# Patient Record
Sex: MALE | Race: WHITE | Employment: OTHER | URBAN - METROPOLITAN AREA
[De-identification: names, ages, dates, MRNs, and addresses within clinical notes are randomized per-mention and may not be internally consistent; named-entity substitution may affect disease eponyms.]

---

## 2017-07-31 RX ORDER — CEFAZOLIN SODIUM IN 0.9 % NACL 2 G/50 ML
2 INTRAVENOUS SOLUTION, PIGGYBACK (ML) INTRAVENOUS ONCE
Status: CANCELLED | OUTPATIENT
Start: 2017-07-31 | End: 2017-07-31

## 2017-07-31 NOTE — H&P
[address] [facility]  Pre Operative History and Physical Exam    Patient ID:  Trace Hui  [id number]  6 y.o.  1945    Today: July 31, 2017       CC:  Right hip pain    HPI:   The patient has end stage arthritis of the right hip. The patient was evaluated and examined in the office prior to today and was found to have a history on physical exam consistent with intra-articular pathology of the right hip. Patient complains of anterior groin pain predominately. Pain occurs during activity. Patient has difficulty putting on socks/shoes and has notable pain when getting up from a chair and getting out of a car. Patient does not complain of significant lateral hip pain or radicular pain down the leg. There have been no changes to the patient's orthopedic condition since the last office visit    Past Medical History:  No past medical history on file. Past Surgical History:  No past surgical history on file. Medications:     Prior to Admission medications    Not on File       Family History:   No family history on file. Social History:      Social History   Substance Use Topics    Smoking status: Not on file    Smokeless tobacco: Not on file    Alcohol use Not on file         Allergies: Allergies not on file     Vitals: There were no vitals taken for this visit. Objective:         General: No Acute distress                   HEENT: Normocephalic/atramatic                   Lungs:  Breathing non-labored                   Heart:   RRR                    Abdomen: soft       Extremities:  Pain with ROM of the right hip which manifests as anterior groin pain. There is decreased internal and external rotation of the affected hip. No significant pain with palpation over the greater trochanteric bursa. No radicular pain with straight leg raise. Patient walks with and antalgic gait. Distally patient has no neurologic deficit.       There is no problem list on file for this patient. Assessment:   1. Arthritis of the Right hip    Plan:   The patient has failed previous conservative treatment for this condition. The patient has pain in the left hip which causes daily symptoms which affects the patient's activities of daily living and quality of life. The risks, benefits, alternatives and possible complications of left total hip arthroplasty have been discussed with the patient including but not limited to infection, bleeding, damage to nerves and blood vessels with particular attention given to risk of damage of the femoral, obturator, lateral femoral cutaneous, superior gluteal, inferior gluteal, and sciatic nerve, risk of dislocation, fracture both intra-op and post-op, limb length inequality, polyethylene wear, implant loosening, risk for continued pain, and risk for revision surgery secondary to but not limited to all of these discussed risks. Further we discussed risk of venous thrombo-embolism including deep vein thrombosis and pulmonary embolism despite being on prophylaxis. We have also previously discussed the potential of morbidity and mortality associated with, but not limited to, the actual surgical procedure, anesthesia, prior medical conditions, and/or the administration of medications perioperatively. I have made no guarantees to the patient regarding outcomes and the patient has voiced understanding of that. The patient has been given the opportunity to ask questions all of which have been answered and the patient wishes to proceed. The patient will be admitted the day of surgery for right total hip replacement.         Signed By: Villa Antonio MD  July 31, 2017

## 2017-08-07 ENCOUNTER — HOSPITAL ENCOUNTER (OUTPATIENT)
Dept: PHYSICAL THERAPY | Age: 72
Discharge: HOME | End: 2017-08-07
Payer: MEDICARE

## 2017-08-07 ENCOUNTER — HOSPITAL ENCOUNTER (OUTPATIENT)
Dept: SURGERY | Age: 72
Discharge: HOME | End: 2017-08-07
Payer: MEDICARE

## 2017-08-07 LAB
ALBUMIN SERPL BCP-MCNC: 3.9 G/DL (ref 3.2–4.6)
ANION GAP BLD CALC-SCNC: 5 MMOL/L (ref 7–16)
APPEARANCE UR: CLEAR
APTT PPP: 27.8 SEC (ref 23.5–31.7)
BACTERIA SPEC CULT: ABNORMAL
BASOPHILS # BLD AUTO: 0 K/UL (ref 0–0.2)
BASOPHILS # BLD: 1 % (ref 0–2)
BILIRUB UR QL: NEGATIVE
BUN SERPL-MCNC: 24 MG/DL (ref 8–23)
CALCIUM SERPL-MCNC: 9.1 MG/DL (ref 8.3–10.4)
CHLORIDE SERPL-SCNC: 104 MMOL/L (ref 98–107)
CO2 SERPL-SCNC: 29 MMOL/L (ref 21–32)
COLOR UR: YELLOW
CREAT SERPL-MCNC: 1.32 MG/DL (ref 0.8–1.5)
DIFFERENTIAL METHOD BLD: ABNORMAL
EOSINOPHIL # BLD: 0.3 K/UL (ref 0–0.8)
EOSINOPHIL NFR BLD: 5 % (ref 0.5–7.8)
ERYTHROCYTE [DISTWIDTH] IN BLOOD BY AUTOMATED COUNT: 13.3 % (ref 11.9–14.6)
EST. AVERAGE GLUCOSE BLD GHB EST-MCNC: 111 MG/DL
GLUCOSE SERPL-MCNC: 91 MG/DL (ref 65–100)
GLUCOSE UR STRIP.AUTO-MCNC: NEGATIVE MG/DL
HBA1C MFR BLD: 5.5 % (ref 4.8–6)
HCT VFR BLD AUTO: 41 % (ref 41.1–50.3)
HGB BLD-MCNC: 13.5 G/DL (ref 13.6–17.2)
HGB UR QL STRIP: NEGATIVE
IMM GRANULOCYTES # BLD: 0 K/UL (ref 0–0.5)
IMM GRANULOCYTES NFR BLD AUTO: 0.4 % (ref 0–5)
INR PPP: 1 (ref 0.9–1.2)
KETONES UR QL STRIP.AUTO: NEGATIVE MG/DL
LEUKOCYTE ESTERASE UR QL STRIP.AUTO: NEGATIVE
LYMPHOCYTES # BLD AUTO: 36 % (ref 13–44)
LYMPHOCYTES # BLD: 2 K/UL (ref 0.5–4.6)
MCH RBC QN AUTO: 31.1 PG (ref 26.1–32.9)
MCHC RBC AUTO-ENTMCNC: 32.9 G/DL (ref 31.4–35)
MCV RBC AUTO: 94.5 FL (ref 79.6–97.8)
MONOCYTES # BLD: 0.6 K/UL (ref 0.1–1.3)
MONOCYTES NFR BLD AUTO: 11 % (ref 4–12)
NEUTS SEG # BLD: 2.6 K/UL (ref 1.7–8.2)
NEUTS SEG NFR BLD AUTO: 47 % (ref 43–78)
NITRITE UR QL STRIP.AUTO: NEGATIVE
PH UR STRIP: 5.5 [PH] (ref 5–9)
PLATELET # BLD AUTO: 123 K/UL (ref 150–450)
PMV BLD AUTO: 11.5 FL (ref 10.8–14.1)
POTASSIUM SERPL-SCNC: 5 MMOL/L (ref 3.5–5.1)
PROT UR STRIP-MCNC: NEGATIVE MG/DL
PROTHROMBIN TIME: 10.7 SEC (ref 9.6–12)
RBC # BLD AUTO: 4.34 M/UL (ref 4.23–5.67)
SERVICE CMNT-IMP: ABNORMAL
SODIUM SERPL-SCNC: 138 MMOL/L (ref 136–145)
SP GR UR REFRACTOMETRY: 1.02 (ref 1–1.02)
UROBILINOGEN UR QL STRIP.AUTO: 0.2 EU/DL (ref 0.2–1)
WBC # BLD AUTO: 5.6 K/UL (ref 4.3–11.1)

## 2017-08-07 PROCEDURE — 81003 URINALYSIS AUTO W/O SCOPE: CPT | Performed by: ORTHOPAEDIC SURGERY

## 2017-08-07 PROCEDURE — G8978 MOBILITY CURRENT STATUS: HCPCS

## 2017-08-07 PROCEDURE — G8980 MOBILITY D/C STATUS: HCPCS

## 2017-08-07 PROCEDURE — 93005 ELECTROCARDIOGRAM TRACING: CPT | Performed by: ORTHOPAEDIC SURGERY

## 2017-08-07 PROCEDURE — 80048 BASIC METABOLIC PNL TOTAL CA: CPT | Performed by: ORTHOPAEDIC SURGERY

## 2017-08-07 PROCEDURE — 85610 PROTHROMBIN TIME: CPT | Performed by: ORTHOPAEDIC SURGERY

## 2017-08-07 PROCEDURE — 82040 ASSAY OF SERUM ALBUMIN: CPT | Performed by: ORTHOPAEDIC SURGERY

## 2017-08-07 PROCEDURE — 85730 THROMBOPLASTIN TIME PARTIAL: CPT | Performed by: ORTHOPAEDIC SURGERY

## 2017-08-07 PROCEDURE — 83036 HEMOGLOBIN GLYCOSYLATED A1C: CPT | Performed by: ORTHOPAEDIC SURGERY

## 2017-08-07 PROCEDURE — 36415 COLL VENOUS BLD VENIPUNCTURE: CPT | Performed by: ORTHOPAEDIC SURGERY

## 2017-08-07 PROCEDURE — G8979 MOBILITY GOAL STATUS: HCPCS

## 2017-08-07 PROCEDURE — 85025 COMPLETE CBC W/AUTO DIFF WBC: CPT | Performed by: ORTHOPAEDIC SURGERY

## 2017-08-07 PROCEDURE — 87641 MR-STAPH DNA AMP PROBE: CPT | Performed by: ORTHOPAEDIC SURGERY

## 2017-08-07 PROCEDURE — 97161 PT EVAL LOW COMPLEX 20 MIN: CPT

## 2017-08-07 PROCEDURE — 77030027138 HC INCENT SPIROMETER -A

## 2017-08-07 PROCEDURE — 80307 DRUG TEST PRSMV CHEM ANLYZR: CPT | Performed by: ORTHOPAEDIC SURGERY

## 2017-08-07 RX ORDER — CHOLECALCIFEROL (VITAMIN D3) 125 MCG
1-2 CAPSULE ORAL
COMMUNITY
End: 2017-08-17

## 2017-08-07 RX ORDER — TRAMADOL HYDROCHLORIDE 50 MG/1
50 TABLET ORAL
COMMUNITY
End: 2019-05-10

## 2017-08-07 RX ORDER — METOPROLOL TARTRATE 25 MG/1
TABLET, FILM COATED ORAL
COMMUNITY
End: 2017-08-07 | Stop reason: CLARIF

## 2017-08-07 RX ORDER — LOSARTAN POTASSIUM 100 MG/1
100 TABLET ORAL DAILY
COMMUNITY

## 2017-08-07 RX ORDER — METOPROLOL SUCCINATE 25 MG/1
25 TABLET, EXTENDED RELEASE ORAL
COMMUNITY

## 2017-08-07 RX ORDER — DIPHENHYDRAMINE HCL 25 MG
25-50 CAPSULE ORAL
COMMUNITY
End: 2019-05-10

## 2017-08-07 NOTE — PERIOP NOTES
Recent Results (from the past 12 hour(s))   CBC WITH AUTOMATED DIFF    Collection Time: 08/07/17  9:15 AM   Result Value Ref Range    WBC 5.6 4.3 - 11.1 K/uL    RBC 4.34 4.23 - 5.67 M/uL    HGB 13.5 (L) 13.6 - 17.2 g/dL    HCT 41.0 (L) 41.1 - 50.3 %    MCV 94.5 79.6 - 97.8 FL    MCH 31.1 26.1 - 32.9 PG    MCHC 32.9 31.4 - 35.0 g/dL    RDW 13.3 11.9 - 14.6 %    PLATELET 796 (L) 392 - 450 K/uL    MPV 11.5 10.8 - 14.1 FL    DF AUTOMATED      NEUTROPHILS 47 43 - 78 %    LYMPHOCYTES 36 13 - 44 %    MONOCYTES 11 4.0 - 12.0 %    EOSINOPHILS 5 0.5 - 7.8 %    BASOPHILS 1 0.0 - 2.0 %    IMMATURE GRANULOCYTES 0.4 0.0 - 5.0 %    ABS. NEUTROPHILS 2.6 1.7 - 8.2 K/UL    ABS. LYMPHOCYTES 2.0 0.5 - 4.6 K/UL    ABS. MONOCYTES 0.6 0.1 - 1.3 K/UL    ABS. EOSINOPHILS 0.3 0.0 - 0.8 K/UL    ABS. BASOPHILS 0.0 0.0 - 0.2 K/UL    ABS. IMM.  GRANS. 0.0 0.0 - 0.5 K/UL   PROTHROMBIN TIME + INR    Collection Time: 08/07/17  9:15 AM   Result Value Ref Range    Prothrombin time 10.7 9.6 - 12.0 sec    INR 1.0 0.9 - 1.2     PTT    Collection Time: 08/07/17  9:15 AM   Result Value Ref Range    aPTT 27.8 23.5 - 30.0 SEC   METABOLIC PANEL, BASIC    Collection Time: 08/07/17  9:15 AM   Result Value Ref Range    Sodium 138 136 - 145 mmol/L    Potassium 5.0 3.5 - 5.1 mmol/L    Chloride 104 98 - 107 mmol/L    CO2 29 21 - 32 mmol/L    Anion gap 5 (L) 7 - 16 mmol/L    Glucose 91 65 - 100 mg/dL    BUN 24 (H) 8 - 23 MG/DL    Creatinine 1.32 0.8 - 1.5 MG/DL    GFR est AA >60 >60 ml/min/1.73m2    GFR est non-AA 57 (L) >60 ml/min/1.73m2    Calcium 9.1 8.3 - 10.4 MG/DL   URINALYSIS W/ RFLX MICROSCOPIC    Collection Time: 08/07/17  9:15 AM   Result Value Ref Range    Color YELLOW      Appearance CLEAR      Specific gravity 1.020 1.001 - 1.023      pH (UA) 5.5 5.0 - 9.0      Protein NEGATIVE  NEG mg/dL    Glucose NEGATIVE  mg/dL    Ketone NEGATIVE  NEG mg/dL    Bilirubin NEGATIVE  NEG      Blood NEGATIVE  NEG      Urobilinogen 0.2 0.2 - 1.0 EU/dL    Nitrites NEGATIVE NEG      Leukocyte Esterase NEGATIVE  NEG     ALBUMIN    Collection Time: 08/07/17  9:15 AM   Result Value Ref Range    Albumin 3.9 3.2 - 4.6 g/dL   HEMOGLOBIN A1C WITH EAG    Collection Time: 08/07/17  9:15 AM   Result Value Ref Range    Hemoglobin A1c 5.5 4.8 - 6.0 %    Est. average glucose 111 mg/dL

## 2017-08-07 NOTE — PROGRESS NOTES
08/07/17 0900   Oxygen Therapy   O2 Sat (%) 96 %   Pulse via Oximetry 66 beats per minute   O2 Device Room air   Pre-Treatment   Breath Sounds Bilateral Clear   Pre FEV1 (liters) 2.9 liters   % Predicted 90   Incentive Spirometry Treatment   Actual Volume (ml) 2250 ml     Initial respiratory Assessment completed with pt. Pt was interviewed and evaluated in [facility] prior to surgery. Patient ID:  Francesco Huynh  [id number]  3 y.o.  1945  Surgeon: {[facility]:[id number]}  Date of Surgery: The linked surgery was not found. Please check manually. Procedure: Total {PAT ORTHO EXTREMITY:[id number]} Arthroplasty  Primary Care Physician: Georgi Simeon -[phone]  Specialists:                                  Pt instructed in the use of Incentive Spirometry. Pt instructed to bring Incentive Spirometer back on date of surgery & to start using Is upon return to pt room.     Pt taught proper cough technique      History of smoking:   NONE      Quit date:    Secondhand smoke:      Past procedures with Oxygen desaturation:NONE    Past Medical History:   Diagnosis Date    Adverse effect of anesthesia     states sedation (Versed) makes him \"drunk as a skunk,\" patient can start getting \"vulgar\"    Environmental and seasonal allergies     HTN (hypertension)     managed with medication     Insomnia     Osteoarthritis     RBBB     per EKG 3/21/17                                    ENT:SINUS                                                                                                                      Respiratory history:                                 SOB  ON EXERTION  DENIES SOB                                  Respiratory meds:                                                         FAMILY PRESENT:             YES        NO                                        PAST SLEEP STUDY:        YES       NO  HX OF CIERA:                        YES      NO                                     CIERA assessment:                                               SLEEPS ON SIDE   BACK   STOMACH                                                 PHYSICAL EXAM   There is no height or weight on file to calculate BMI.    Visit Vitals    SpO2 (P) 96%     Neck circumference:      cm    Loud snoring:YES  DENIES    Witnessed apnea or wakening gasping or choking:,DENIES, APNEA    Awakens with headaches:DENIES    Morning or daytime tiredness/ sleepiness: DENIES  TIRED     Dry mouth or sore throat in morning:YES DENIES    Freidman stage:    SACS score:    STOP/BANG:                              CPAP:        ALBUTEROL NEB Q6 PRN          SPACER       CONT SAT HS      JUN- PT AGREEABLE TO FOLLOW UP IF DESATS  NONE  Referrals:    Pt. Phone Number:

## 2017-08-07 NOTE — PROGRESS NOTES
Angeles Lamar  : (49 y.o.) [facility] [place] [address].  [phone]       Physical Therapy Prehab Plan of Treatment and Evaluation Summary:2017    ICD-10: Treatment Diagnosis:   · Pain in Right Hip (M25.551)  · Stiffness of Right Hip, Not elsewhere classified (M25.651)  · Difficulty in walking, Not elsewhere classified (R26.2)  Precautions/Allergies:   Percocet [oxycodone-acetaminophen] and Sulfa (sulfonamide antibiotics)  MEDICAL/REFERRING DIAGNOSIS:  Unilateral primary osteoarthritis, right hip [M16.11]  REFERRING PHYSICIAN: Nataly Dior MD  DATE OF SURGERY: 17   Assessment:   Comments:  Pt. Reports he plans on anterior hip and will go home with wife for support. PROBLEM LIST (Impacting functional limitations):  Mr. Bladimir Rdz presents with the following right lower extremity(s) problems:  1. Strength  2. Range of Motion  3. Home Exercise Program  4. Pain   INTERVENTIONS PLANNED:  1. Home Exercise Program  2. Educational Discussion     TREATMENT PLAN: Effective Dates: 2017 TO 2017. Frequency/Duration: Patient to continue to perform home exercise program at least twice per day up until his surgery. GOALS: (Goals have been discussed and agreed upon with patient.)  Discharge Goals: Time Frame: 1 Day  1. Patient will demonstrate independence with a home exercise program designed to increase strength, range of motion and pain control to minimize functional deficits and optimize patient for total joint replacement. Rehabilitation Potential For Stated Goals: Good  Regarding Jeronimo Renteria therapy, I certify that the treatment plan above will be carried out by a therapist or under their direction.   Thank you for this referral,  Jeremy De, PT               HISTORY:   Present Symptoms:  Pain Intensity 1:  (8 at worst)  Pain Location 1: Hip   History of Present Injury/Illness (Reason for Referral):  Medical/Referring Diagnosis: Unilateral primary osteoarthritis, right hip [M16.11]   Past Medical History/Comorbidities:   Mr. Maurilio Lea  has a past medical history of Adverse effect of anesthesia; Environmental and seasonal allergies; HTN (hypertension); Insomnia; and Osteoarthritis. Mr. Maurilio Lea  has a past surgical history that includes tonsillectomy (1950's); ankle fracture tx (Left, 07/2012); rotator cuff repair (Right, 11/2012); and knee arthroscopy (Left, 2016).   Social History/Living Environment:   Home Environment: Private residence  # Steps to Enter: 2  Rails to Enter: No  One/Two Story Residence: One story  Living Alone: No  Support Systems: Spouse/Significant Other/Partner  Patient Expects to be Discharged to[de-identified] Private residence  Current DME Used/Available at Home: Belia Salinas, naima, Noble Peterson, mauricio, Cornelio Davis, [address] chair, Com[employer], bedside  Tub or Shower Type: Shower  Work/Activity:  retired  Dominant Side:  LEFT  Current Medications:  See [address] note   Number of Personal Factors/Comorbidities that affect the Plan of Care: 1-2: MODERATE COMPLEXITY   EXAMINATION:   ADLs (Current Functional Status):   Ambulation:  [x] Independent  [] Walk Indoors Only  [] Walk Outdoors  [] Use Assistive Device  [] Use Wheelchair Only Dressing:  [x] Independent  Requires Assistance from Someone for:  [] Sock/Shoes  [] Pants  [] Everything   Bathing/Showering:   [x] Independent  [] Requires Assistance from Someone  [] [address]:  [x] Routine house and yard work  [] Light Housework Only  [] None   Observation/Orthostatic Postural Assessment:   Exceptions to [employer] shoulders  ROM/Flexibility:   Gross Assessment: Yes  AROM: Generally decreased, functional (left LE, left knee extension limited)                       RLE Assessment  RLE Assessment (WDL): Exceptions to WDL (right hip 3/5)  RLE AROM  R Hip Flexion: 100  R Hip ABduction: 10  R Knee Extension: 5   Strength:   Gross Assessment: Yes  Strength: Generally decreased, functional (left LE)                  Functional Mobility:    Gross Assessment: Yes    Gait Description (WDL): Exceptions to WDL  Stand to Sit: Independent, Additional time  Sit to Stand: Independent, Additional time  Distance (ft): 250 Feet (ft)  Ambulation - Level of Assistance: Independent  Speed/Gretta: Delayed  Stance: Right decreased  Gait Abnormalities: Antalgic;Trunk sway increased          Balance:    Sitting: Intact  Standing: Intact   Body Structures Involved:  1. Bones  2. Joints  3. Muscles  4. Ligaments Body Functions Affected:  1. Movement Related Activities and Participation Affected:  1. Mobility   Number of elements that affect the Plan of Care: 1-2: LOW COMPLEXITY   CLINICAL PRESENTATION:   Presentation: Stable and uncomplicated: LOW COMPLEXITY   CLINICAL DECISION MAKING:   Outcome Measure: Tool Used: Lower Extremity Functional Scale (LEFS)  Score:  Initial: 54/80 Most Recent: X/80 (Date: -- )   Interpretation of Score: 20 questions each scored on a 5 point scale with 0 representing \"extreme difficulty or unable to perform\" and 4 representing \"no difficulty\". The lower the score, the greater the functional disability. 80/80 represents no disability. Minimal detectable change is 9 points. Score 80 79-65 64-49 48-33 32-17 16-1 0   Modifier CH CI CJ CK CL CM CN     ? Mobility - Walking and Moving Around:     - CURRENT STATUS: CJ - 20%-39% impaired, limited or restricted    - GOAL STATUS: CJ - 20%-39% impaired, limited or restricted    - D/C STATUS:  CJ - 20%-39% impaired, limited or restricted    Medical Necessity:   · Mr. Jazmyn Jules is expected to optimize his lower extremity strength and ROM in preparation for joint replacement surgery. Reason for Services/Other Comments:  · Achieve baseline assesment of musculoskeletal system, functional mobility and home environment. , educate in PT HEP in preparation for surgery, educate in hospital plan of care.    Use of outcome tool(s) and clinical judgement create a POC that gives a: Clear prediction of patient's progress: LOW COMPLEXITY   TREATMENT:   Treatment/Session Assessment:  Patient was instructed in PT- HEP to increase strength and ROM in LEs. Answered all questions. · Post session pain:  No complaints  · Compliance with Program/Exercises: compliant most of the time.   Total Treatment Duration:PT Patient Time In/Time Out  Time In: 0940  Time Out: [address] Elizabeth, PT

## 2017-08-07 NOTE — PERIOP NOTES
Patient verified name, , and surgery as listed in [facility]. Type 3 surgery, PAT joint assessment complete. Labs per surgeon: nicotine/cotinine, hemoglobin A1C, Albumin, UA, BMP, ptt, pt/inr, cbs, T&S DOS; results within anesthesia limits. Nicotine/Cotinine results pending-charge nurse to follow-up. Labs per anesthesia protocol: All required lab work included in surgeon's orders. EKG: completed 3/21/17; results within anesthesia limits. EKG (3/21/17) received from patient's PCP's office-placed on chart for anesthesia reference. Hibiclens and instructions to return bottle on DOS given per hospital policy. Nasal Swab collected per MD order and instructions for Mupirocin nasal ointment if required. Patient provided with handouts including Guide to Surgery, Pain Management, Hand Hygiene, Blood Transfusion Education, and [facility] Brochure. Patient answered medical/surgical history questions at their best of ability. All prior to admission medications documented in [facility]. Original medication prescription bottle NOT visualized during patient appointment. Patient instructed to hold all vitamins 7 days prior to surgery and NSAIDS 5 days prior to surgery. Medications to be held on the day of surgery: Vitamins and Aleve. Patient instructed to continue previous medications as prescribed prior to surgery and to take the following medications the day of surgery according to anesthesia guidelines with a small sip of water: Tramadol if needed. Patient instructed to bring incentive spirometer and bottle of Hibiclens to the hospital on the DOS. Patient teach back successful and patient demonstrates knowledge of instruction.

## 2017-08-07 NOTE — PERIOP NOTES
Labs dated 8/7/17 routed via [address] [facility] to patients PCP, Dr. Mauricio Salcedo, per Dr. Criselda Watkins request.

## 2017-08-07 NOTE — ADVANCED PRACTICE NURSE
Total Joint Surgery Preoperative Chart Review      Patient ID:  Francesco Huynh  [id number]  89 y.o.  1945  Surgeon: Dr Ruiz Pérez  Date of Surgery: 8/16/2017  Procedure: Total Right Hip Arthroplasty  Primary Care Physician: Georgi Simeon -[phone]  Specialty Physician(s):      Subjective:   Francesco Huynh is a 66 y.o. WHITE OR  male who presents for preoperative evaluation for Total Right Hip arthroplasty. This is a preoperative chart review note based on data collected by the nurse at the surgical Pre-Assessment visit. Past Medical History:   Diagnosis Date    Adverse effect of anesthesia     states sedation (Versed) makes him \"drunk as a skunk,\" patient can start getting \"vulgar\"    Environmental and seasonal allergies     HTN (hypertension)     managed with medication     Insomnia     Osteoarthritis     RBBB     per EKG 3/21/17      Past Surgical History:   Procedure Laterality Date    HX ANKLE FRACTURE TX Left 07/2012    HX KNEE ARTHROSCOPY Left 2016    HX ROTATOR CUFF REPAIR Right 11/2012    HX TONSILLECTOMY  1950's     Family History   Problem Relation Age of Onset    Cancer Father       Social History   Substance Use Topics    Smoking status: Never Smoker    Smokeless tobacco: Never Used    Alcohol use Yes      Comment: occa       Prior to Admission medications    Medication Sig Start Date End Date Taking? Authorizing Provider   losartan (COZAAR) 100 mg tablet Take 100 mg by mouth daily. Indications: hypertension   Yes Historical Provider   MULTIVIT-MINERALS/FA/LYCOPENE (ONE-A-DAY MEN'S PO) Take 2 Tabs by mouth every evening. Yes Historical Provider   diphenhydrAMINE (BENADRYL) 25 mg capsule Take 25-50 mg by mouth nightly. Indications: \"for sleep and allergies\"   Yes Historical Provider   MELATONIN PO Take  by mouth nightly. Yes Historical Provider   traMADol (ULTRAM) 50 mg tablet Take 50 mg by mouth every six (6) hours as needed for Pain.  Take / use AM day of surgery per anesthesia protocols PRN   Indications: Pain   Yes Historical Provider   naproxen sodium (ALEVE) 220 mg cap Take 1-2 Tabs by mouth nightly. Indications: Pain   Yes Historical Provider   VIT C/VIT E/LUTEIN/MIN/OMEGA-3 (OCUVITE PO) Take 1 Tab by mouth every evening. Yes Historical Provider   GLUCOSAMINE/CHONDR PUENTE A SOD (OSTEO BI-FLEX PO) Take 2 Tabs by mouth every evening. Yes Historical Provider   MULTIVITAMIN WITH MINERALS (HAIR,SKIN AND NAILS PO) Take 3 Tabs by mouth every evening. Yes Historical Provider   metoprolol succinate (TOPROL-XL) 25 mg XL tablet Take 25 mg by mouth nightly. Yes Historical Provider     Allergies   Allergen Reactions    Percocet [Oxycodone-Acetaminophen] Itching    Sulfa (Sulfonamide Antibiotics) Hives          Objective:     Physical Exam:   Patient Vitals for the past 24 hrs:   Temp Pulse Resp BP SpO2   08/07/17 1022 - 60 - (!) 158/97 98 %   08/07/17 1019 98 °F (36.7 °C) - 18 - -       ECG:    EKG Results     Procedure 720 Value Units Date/Time    EKG, 12 LEAD, INITIAL [[id number]]     Order Status:  Completed           Data Review:   Labs:   Recent Results (from the past 24 hour(s))   CBC WITH AUTOMATED DIFF    Collection Time: 08/07/17  9:15 AM   Result Value Ref Range    WBC 5.6 4.3 - 11.1 K/uL    RBC 4.34 4.23 - 5.67 M/uL    HGB 13.5 (L) 13.6 - 17.2 g/dL    HCT 41.0 (L) 41.1 - 50.3 %    MCV 94.5 79.6 - 97.8 FL    MCH 31.1 26.1 - 32.9 PG    MCHC 32.9 31.4 - 35.0 g/dL    RDW 13.3 11.9 - 14.6 %    PLATELET 427 (L) 431 - 450 K/uL    MPV 11.5 10.8 - 14.1 FL    DF AUTOMATED      NEUTROPHILS 47 43 - 78 %    LYMPHOCYTES 36 13 - 44 %    MONOCYTES 11 4.0 - 12.0 %    EOSINOPHILS 5 0.5 - 7.8 %    BASOPHILS 1 0.0 - 2.0 %    IMMATURE GRANULOCYTES 0.4 0.0 - 5.0 %    ABS. NEUTROPHILS 2.6 1.7 - 8.2 K/UL    ABS. LYMPHOCYTES 2.0 0.5 - 4.6 K/UL    ABS. MONOCYTES 0.6 0.1 - 1.3 K/UL    ABS. EOSINOPHILS 0.3 0.0 - 0.8 K/UL    ABS. BASOPHILS 0.0 0.0 - 0.2 K/UL    ABS. IMM.  GRANS. 0.0 0.0 - 0.5 K/UL   PROTHROMBIN TIME + INR    Collection Time: 08/07/17  9:15 AM   Result Value Ref Range    Prothrombin time 10.7 9.6 - 12.0 sec    INR 1.0 0.9 - 1.2     PTT    Collection Time: 08/07/17  9:15 AM   Result Value Ref Range    aPTT 27.8 23.5 - 51.6 SEC   METABOLIC PANEL, BASIC    Collection Time: 08/07/17  9:15 AM   Result Value Ref Range    Sodium 138 136 - 145 mmol/L    Potassium 5.0 3.5 - 5.1 mmol/L    Chloride 104 98 - 107 mmol/L    CO2 29 21 - 32 mmol/L    Anion gap 5 (L) 7 - 16 mmol/L    Glucose 91 65 - 100 mg/dL    BUN 24 (H) 8 - 23 MG/DL    Creatinine 1.32 0.8 - 1.5 MG/DL    GFR est AA >60 >60 ml/min/1.73m2    GFR est non-AA 57 (L) >60 ml/min/1.73m2    Calcium 9.1 8.3 - 10.4 MG/DL   URINALYSIS W/ RFLX MICROSCOPIC    Collection Time: 08/07/17  9:15 AM   Result Value Ref Range    Color YELLOW      Appearance CLEAR      Specific gravity 1.020 1.001 - 1.023      pH (UA) 5.5 5.0 - 9.0      Protein NEGATIVE  NEG mg/dL    Glucose NEGATIVE  mg/dL    Ketone NEGATIVE  NEG mg/dL    Bilirubin NEGATIVE  NEG      Blood NEGATIVE  NEG      Urobilinogen 0.2 0.2 - 1.0 EU/dL    Nitrites NEGATIVE  NEG      Leukocyte Esterase NEGATIVE  NEG     ALBUMIN    Collection Time: 08/07/17  9:15 AM   Result Value Ref Range    Albumin 3.9 3.2 - 4.6 g/dL   HEMOGLOBIN A1C WITH EAG    Collection Time: 08/07/17  9:15 AM   Result Value Ref Range    Hemoglobin A1c 5.5 4.8 - 6.0 %    Est. average glucose 111 mg/dL         Problem List:  )There is no problem list on file for this patient. Total Joint Surgery Pre-Assessment Recommendations:        Low risk assessment based on historical assessment. No further recommendations prior to scheduled surgery.            Signed By: GARCIA Baez    August 7, 2017

## 2017-08-08 LAB
COTININE UR QL SCN: NEGATIVE NG/ML
DRUG SCREEN COMMENT:, 753798: NORMAL

## 2017-08-08 NOTE — PERIOP NOTES
Prescription for Mupirocin ointment 22g tube, apply intranasally to both nostrils BID x5 days pre-operatively or for a total of 10 doses; called to [facility] at [phone]. Message left for pt to return call to [phone] for swab results. 8/7/2017  1:45 PM - Mark, Lab In [id number]   Component Results   Component Value Flag Ref Range Units Status   Special Requests: NASAL O2     Final   Culture result:  (A)    Final   MRSA target DNA detected, SA target DNA detected.       A positive test result does not necessarily indicate the presence of viable organisms.  It is however, presumptive for the presence of MRSA or SA.

## 2017-08-08 NOTE — PERIOP NOTES
8/8/2017  8:39 AM - Mark, Lab In [facility]   Component Results   Component Value Flag Ref Range Units Status   Cotinine Screen, urine NEGATIVE   Azittr=096 ng/mL Final   Drug Screen Comment: Comment     Final

## 2017-08-09 VITALS
HEART RATE: 60 BPM | HEIGHT: 70 IN | TEMPERATURE: 98 F | DIASTOLIC BLOOD PRESSURE: 97 MMHG | OXYGEN SATURATION: 98 % | WEIGHT: 194 LBS | RESPIRATION RATE: 18 BRPM | BODY MASS INDEX: 27.77 KG/M2 | SYSTOLIC BLOOD PRESSURE: 158 MMHG

## 2017-08-09 NOTE — PROGRESS NOTES
08/07/17 0900   Oxygen Therapy   O2 Sat (%) 96 %   Pulse via Oximetry 66 beats per minute   O2 Device Room air   Pre-Treatment   Breath Sounds Bilateral Clear   Pre FEV1 (liters) 2.9 liters   % Predicted 90   Incentive Spirometry Treatment   Actual Volume (ml) 2250 ml     Initial respiratory Assessment completed with pt. Pt was interviewed and evaluated in [facility] prior to surgery. Patient ID:  Nick Seals  [id number]  67 y.o.  1945  Surgeon: Blake Neal  Date of Surgery: 8/16/2017  Procedure: Total Right Hip Arthroplasty  Primary Care Physician: Oumar Eugene -[phone]  Specialists:                                  Pt instructed in the use of Incentive Spirometry. Pt instructed to bring Incentive Spirometer back on date of surgery & to start using Is upon return to pt room.     Pt taught proper cough technique      History of smoking:   NONE      Quit date:    Secondhand smoke:FATHER -PIPE      Past procedures with Oxygen desaturation:NONE    Past Medical History:   Diagnosis Date    Adverse effect of anesthesia     states sedation (Versed) makes him \"drunk as a skunk,\" patient can start getting \"vulgar\"    Environmental and seasonal allergies     HTN (hypertension)     managed with medication     Insomnia     Osteoarthritis     RBBB     per EKG 3/21/17                                                                                                                                                        Respiratory history:                                   DENIES SOB                                  Respiratory meds:                                                         FAMILY PRESENT:             YES        WIFE                                      PAST SLEEP STUDY:               NO  HX OF CIERA:                               NO                                     CIERA assessment:                                               SLEEPS ON SIDE PHYSICAL EXAM   Body mass index is 27.84 kg/(m^2).    Visit Vitals    BP (!) 158/97    Pulse 60    Temp 98 °F (36.7 °C)    Resp 18    Ht 5' 10\" (1.778 m)    Wt 88 kg (194 lb)    SpO2 98%    BMI 27.84 kg/m2     Neck circumference: 42     cm    Loud snoring: PUFFS  Witnessed apnea or wakening gasping or choking:,DENIES   Awakens with headaches:DENIES    Morning or daytime tiredness/ sleepiness: DENIES     Dry mouth or sore throat in morning: DENIES    Freidman stage:4    SACS score:11    STOP/BAN                              CPAP:NA             NONE  Referrals:    Pt. Phone Number:

## 2017-08-15 ENCOUNTER — ANESTHESIA EVENT (OUTPATIENT)
Dept: SURGERY | Age: 72
DRG: 470 | End: 2017-08-15
Payer: MEDICARE

## 2017-08-15 RX ORDER — VANCOMYCIN HYDROCHLORIDE
1250 ONCE
Status: COMPLETED | OUTPATIENT
Start: 2017-08-16 | End: 2017-08-16

## 2017-08-16 ENCOUNTER — HOSPITAL ENCOUNTER (INPATIENT)
Age: 72
LOS: 1 days | Discharge: HOME HEALTH | DRG: 470 | End: 2017-08-17
Attending: ORTHOPAEDIC SURGERY | Admitting: ORTHOPAEDIC SURGERY
Payer: MEDICARE

## 2017-08-16 ENCOUNTER — ANESTHESIA (OUTPATIENT)
Dept: SURGERY | Age: 72
DRG: 470 | End: 2017-08-16
Payer: MEDICARE

## 2017-08-16 ENCOUNTER — APPOINTMENT (OUTPATIENT)
Dept: GENERAL RADIOLOGY | Age: 72
DRG: 470 | End: 2017-08-16
Attending: ORTHOPAEDIC SURGERY
Payer: MEDICARE

## 2017-08-16 PROBLEM — M16.9 OA (OSTEOARTHRITIS) OF HIP: Status: ACTIVE | Noted: 2017-08-16

## 2017-08-16 LAB
ABO + RH BLD: NORMAL
BLOOD GROUP ANTIBODIES SERPL: NORMAL
GLUCOSE BLD STRIP.AUTO-MCNC: 235 MG/DL (ref 65–100)
GLUCOSE BLD STRIP.AUTO-MCNC: 84 MG/DL (ref 65–100)
HGB BLD-MCNC: 12.2 G/DL (ref 13.6–17.2)
SPECIMEN EXP DATE BLD: NORMAL

## 2017-08-16 PROCEDURE — 77030033067 HC SUT PDO STRATFX SPIR J&J -B: Performed by: ORTHOPAEDIC SURGERY

## 2017-08-16 PROCEDURE — C1776 JOINT DEVICE (IMPLANTABLE): HCPCS | Performed by: ORTHOPAEDIC SURGERY

## 2017-08-16 PROCEDURE — 85018 HEMOGLOBIN: CPT | Performed by: ORTHOPAEDIC SURGERY

## 2017-08-16 PROCEDURE — 74011250636 HC RX REV CODE- 250/636: Performed by: ORTHOPAEDIC SURGERY

## 2017-08-16 PROCEDURE — 74011250636 HC RX REV CODE- 250/636: Performed by: ANESTHESIOLOGY

## 2017-08-16 PROCEDURE — 76210000006 HC OR PH I REC 0.5 TO 1 HR: Performed by: ORTHOPAEDIC SURGERY

## 2017-08-16 PROCEDURE — 77030018836 HC SOL IRR NACL ICUM -A: Performed by: ORTHOPAEDIC SURGERY

## 2017-08-16 PROCEDURE — 74011250636 HC RX REV CODE- 250/636

## 2017-08-16 PROCEDURE — 77030002933 HC SUT MCRYL J&J -A: Performed by: ORTHOPAEDIC SURGERY

## 2017-08-16 PROCEDURE — 77030013727 HC IRR FAN PULSVC ZIMM -B: Performed by: ORTHOPAEDIC SURGERY

## 2017-08-16 PROCEDURE — 77030012890

## 2017-08-16 PROCEDURE — 77030007880 HC KT SPN EPDRL BBMI -B: Performed by: ANESTHESIOLOGY

## 2017-08-16 PROCEDURE — 77030032490 HC SLV COMPR SCD KNE COVD -B

## 2017-08-16 PROCEDURE — 77030019557 HC ELECTRD VES SEAL MEDT -F: Performed by: ORTHOPAEDIC SURGERY

## 2017-08-16 PROCEDURE — 77030018074 HC RTVR SUT ARTH4 S&N -B: Performed by: ORTHOPAEDIC SURGERY

## 2017-08-16 PROCEDURE — 76010000162 HC OR TIME 1.5 TO 2 HR INTENSV-TIER 1: Performed by: ORTHOPAEDIC SURGERY

## 2017-08-16 PROCEDURE — 77030003665 HC NDL SPN BBMI -A: Performed by: ANESTHESIOLOGY

## 2017-08-16 PROCEDURE — 74011250637 HC RX REV CODE- 250/637: Performed by: ANESTHESIOLOGY

## 2017-08-16 PROCEDURE — 77030034849: Performed by: ORTHOPAEDIC SURGERY

## 2017-08-16 PROCEDURE — 77030011640 HC PAD GRND REM COVD -A: Performed by: ORTHOPAEDIC SURGERY

## 2017-08-16 PROCEDURE — 82962 GLUCOSE BLOOD TEST: CPT

## 2017-08-16 PROCEDURE — 77030003666 HC NDL SPINAL BD -A: Performed by: ORTHOPAEDIC SURGERY

## 2017-08-16 PROCEDURE — 77030020782 HC GWN BAIR PAWS FLX 3M -B: Performed by: ANESTHESIOLOGY

## 2017-08-16 PROCEDURE — 77030006777 HC BLD SAW OSC CNMD -B: Performed by: ORTHOPAEDIC SURGERY

## 2017-08-16 PROCEDURE — 77030011266 HC ELECTRD BLD INSL COVD -A: Performed by: ORTHOPAEDIC SURGERY

## 2017-08-16 PROCEDURE — 94760 N-INVAS EAR/PLS OXIMETRY 1: CPT

## 2017-08-16 PROCEDURE — 77030002922 HC SUT FBRWRE ARTH -B: Performed by: ORTHOPAEDIC SURGERY

## 2017-08-16 PROCEDURE — 74011000250 HC RX REV CODE- 250

## 2017-08-16 PROCEDURE — 74011250637 HC RX REV CODE- 250/637: Performed by: ORTHOPAEDIC SURGERY

## 2017-08-16 PROCEDURE — 74011000250 HC RX REV CODE- 250: Performed by: ANESTHESIOLOGY

## 2017-08-16 PROCEDURE — 72170 X-RAY EXAM OF PELVIS: CPT

## 2017-08-16 PROCEDURE — 65270000029 HC RM PRIVATE

## 2017-08-16 PROCEDURE — 86900 BLOOD TYPING SEROLOGIC ABO: CPT | Performed by: ORTHOPAEDIC SURGERY

## 2017-08-16 PROCEDURE — 77030031139 HC SUT VCRL2 J&J -A: Performed by: ORTHOPAEDIC SURGERY

## 2017-08-16 PROCEDURE — 77030019940 HC BLNKT HYPOTHRM STRY -B: Performed by: ANESTHESIOLOGY

## 2017-08-16 PROCEDURE — 77030034479 HC ADH SKN CLSR PRINEO J&J -B: Performed by: ORTHOPAEDIC SURGERY

## 2017-08-16 PROCEDURE — 0SR904A REPLACEMENT OF RIGHT HIP JOINT WITH CERAMIC ON POLYETHYLENE SYNTHETIC SUBSTITUTE, UNCEMENTED, OPEN APPROACH: ICD-10-PCS | Performed by: ORTHOPAEDIC SURGERY

## 2017-08-16 PROCEDURE — 97165 OT EVAL LOW COMPLEX 30 MIN: CPT

## 2017-08-16 PROCEDURE — 74011000250 HC RX REV CODE- 250: Performed by: ORTHOPAEDIC SURGERY

## 2017-08-16 PROCEDURE — 97161 PT EVAL LOW COMPLEX 20 MIN: CPT

## 2017-08-16 PROCEDURE — 76060000034 HC ANESTHESIA 1.5 TO 2 HR: Performed by: ORTHOPAEDIC SURGERY

## 2017-08-16 PROCEDURE — 86580 TB INTRADERMAL TEST: CPT | Performed by: ORTHOPAEDIC SURGERY

## 2017-08-16 PROCEDURE — 74011000302 HC RX REV CODE- 302: Performed by: ORTHOPAEDIC SURGERY

## 2017-08-16 PROCEDURE — 36415 COLL VENOUS BLD VENIPUNCTURE: CPT | Performed by: ORTHOPAEDIC SURGERY

## 2017-08-16 DEVICE — HEAD FEM DELT V40 +0MM NK 36MM -- V40 BIOLOX: Type: IMPLANTABLE DEVICE | Site: HIP | Status: FUNCTIONAL

## 2017-08-16 DEVICE — SHELL ACET SZ E DIA56MM HIP X3 TRITANIUM HMSPHR CLUS H MOD: Type: IMPLANTABLE DEVICE | Site: HIP | Status: FUNCTIONAL

## 2017-08-16 DEVICE — LINER ACET SZ E ID36MM THK5.9MM 10DEG X3 FOR 54-56MM: Type: IMPLANTABLE DEVICE | Site: HIP | Status: FUNCTIONAL

## 2017-08-16 DEVICE — STEM FEM SZ 5 127D 35X108X44MM -- ACCOLADE II V40: Type: IMPLANTABLE DEVICE | Site: HIP | Status: FUNCTIONAL

## 2017-08-16 RX ORDER — KETOROLAC TROMETHAMINE 30 MG/ML
INJECTION, SOLUTION INTRAMUSCULAR; INTRAVENOUS AS NEEDED
Status: DISCONTINUED | OUTPATIENT
Start: 2017-08-16 | End: 2017-08-16 | Stop reason: HOSPADM

## 2017-08-16 RX ORDER — CELECOXIB 200 MG/1
200 CAPSULE ORAL EVERY 12 HOURS
Status: DISCONTINUED | OUTPATIENT
Start: 2017-08-16 | End: 2017-08-17 | Stop reason: HOSPADM

## 2017-08-16 RX ORDER — SODIUM CHLORIDE 0.9 % (FLUSH) 0.9 %
5-10 SYRINGE (ML) INJECTION AS NEEDED
Status: DISCONTINUED | OUTPATIENT
Start: 2017-08-16 | End: 2017-08-17 | Stop reason: HOSPADM

## 2017-08-16 RX ORDER — TRAMADOL HYDROCHLORIDE 50 MG/1
50 TABLET ORAL
Status: DISCONTINUED | OUTPATIENT
Start: 2017-08-16 | End: 2017-08-17 | Stop reason: HOSPADM

## 2017-08-16 RX ORDER — HYDROMORPHONE HYDROCHLORIDE 2 MG/1
2 TABLET ORAL
Status: DISCONTINUED | OUTPATIENT
Start: 2017-08-16 | End: 2017-08-17 | Stop reason: HOSPADM

## 2017-08-16 RX ORDER — BUPIVACAINE HYDROCHLORIDE 7.5 MG/ML
INJECTION, SOLUTION INTRASPINAL AS NEEDED
Status: DISCONTINUED | OUTPATIENT
Start: 2017-08-16 | End: 2017-08-16 | Stop reason: HOSPADM

## 2017-08-16 RX ORDER — CELECOXIB 200 MG/1
200 CAPSULE ORAL EVERY 12 HOURS
Qty: 60 CAP | Refills: 2 | Status: SHIPPED | OUTPATIENT
Start: 2017-08-16 | End: 2017-11-14

## 2017-08-16 RX ORDER — GABAPENTIN 100 MG/1
100 CAPSULE ORAL 2 TIMES DAILY
Qty: 60 CAP | Refills: 0 | Status: SHIPPED | OUTPATIENT
Start: 2017-08-16 | End: 2019-05-10

## 2017-08-16 RX ORDER — DEXAMETHASONE SODIUM PHOSPHATE 100 MG/10ML
10 INJECTION INTRAMUSCULAR; INTRAVENOUS ONCE
Status: DISCONTINUED | OUTPATIENT
Start: 2017-08-17 | End: 2017-08-17 | Stop reason: HOSPADM

## 2017-08-16 RX ORDER — ACETAMINOPHEN 500 MG
1000 TABLET ORAL EVERY 6 HOURS
Status: DISCONTINUED | OUTPATIENT
Start: 2017-08-17 | End: 2017-08-17 | Stop reason: HOSPADM

## 2017-08-16 RX ORDER — HYDROMORPHONE HYDROCHLORIDE 2 MG/1
2 TABLET ORAL
Qty: 90 TAB | Refills: 0 | Status: SHIPPED | OUTPATIENT
Start: 2017-08-16 | End: 2019-05-10

## 2017-08-16 RX ORDER — SODIUM CHLORIDE 0.9 % (FLUSH) 0.9 %
5-10 SYRINGE (ML) INJECTION EVERY 8 HOURS
Status: DISCONTINUED | OUTPATIENT
Start: 2017-08-16 | End: 2017-08-17 | Stop reason: HOSPADM

## 2017-08-16 RX ORDER — ONDANSETRON 8 MG/1
8 TABLET, ORALLY DISINTEGRATING ORAL
Qty: 60 TAB | Refills: 0 | Status: SHIPPED | OUTPATIENT
Start: 2017-08-16 | End: 2019-05-10

## 2017-08-16 RX ORDER — LIDOCAINE HYDROCHLORIDE 10 MG/ML
0.1 INJECTION INFILTRATION; PERINEURAL AS NEEDED
Status: DISCONTINUED | OUTPATIENT
Start: 2017-08-16 | End: 2017-08-16 | Stop reason: HOSPADM

## 2017-08-16 RX ORDER — AMOXICILLIN 250 MG
2 CAPSULE ORAL DAILY
Qty: 120 TAB | Refills: 0 | Status: SHIPPED | OUTPATIENT
Start: 2017-08-17 | End: 2019-05-10

## 2017-08-16 RX ORDER — CEFAZOLIN SODIUM IN 0.9 % NACL 2 G/50 ML
2 INTRAVENOUS SOLUTION, PIGGYBACK (ML) INTRAVENOUS ONCE
Status: COMPLETED | OUTPATIENT
Start: 2017-08-16 | End: 2017-08-16

## 2017-08-16 RX ORDER — ASPIRIN 325 MG
325 TABLET, DELAYED RELEASE (ENTERIC COATED) ORAL EVERY 12 HOURS
Qty: 60 TAB | Refills: 0 | Status: SHIPPED | OUTPATIENT
Start: 2017-08-16 | End: 2019-05-10

## 2017-08-16 RX ORDER — AMOXICILLIN 250 MG
2 CAPSULE ORAL DAILY
Status: DISCONTINUED | OUTPATIENT
Start: 2017-08-17 | End: 2017-08-17 | Stop reason: HOSPADM

## 2017-08-16 RX ORDER — SODIUM CHLORIDE 9 MG/ML
100 INJECTION, SOLUTION INTRAVENOUS CONTINUOUS
Status: DISCONTINUED | OUTPATIENT
Start: 2017-08-16 | End: 2017-08-17 | Stop reason: HOSPADM

## 2017-08-16 RX ORDER — TRANEXAMIC ACID 650 1/1
1300 TABLET ORAL DAILY
Status: DISCONTINUED | OUTPATIENT
Start: 2017-08-17 | End: 2017-08-17 | Stop reason: HOSPADM

## 2017-08-16 RX ORDER — ACETAMINOPHEN 500 MG
1000 TABLET ORAL ONCE
Status: DISCONTINUED | OUTPATIENT
Start: 2017-08-16 | End: 2017-08-16 | Stop reason: HOSPADM

## 2017-08-16 RX ORDER — NALOXONE HYDROCHLORIDE 0.4 MG/ML
.2-.4 INJECTION, SOLUTION INTRAMUSCULAR; INTRAVENOUS; SUBCUTANEOUS
Status: DISCONTINUED | OUTPATIENT
Start: 2017-08-16 | End: 2017-08-17 | Stop reason: HOSPADM

## 2017-08-16 RX ORDER — HYDROMORPHONE HYDROCHLORIDE 2 MG/ML
0.5 INJECTION, SOLUTION INTRAMUSCULAR; INTRAVENOUS; SUBCUTANEOUS
Status: DISCONTINUED | OUTPATIENT
Start: 2017-08-16 | End: 2017-08-16 | Stop reason: HOSPADM

## 2017-08-16 RX ORDER — ROPIVACAINE HYDROCHLORIDE 2 MG/ML
INJECTION, SOLUTION EPIDURAL; INFILTRATION; PERINEURAL AS NEEDED
Status: DISCONTINUED | OUTPATIENT
Start: 2017-08-16 | End: 2017-08-16 | Stop reason: HOSPADM

## 2017-08-16 RX ORDER — ACETAMINOPHEN 10 MG/ML
1000 INJECTION, SOLUTION INTRAVENOUS ONCE
Status: COMPLETED | OUTPATIENT
Start: 2017-08-16 | End: 2017-08-16

## 2017-08-16 RX ORDER — METOPROLOL SUCCINATE 25 MG/1
25 TABLET, EXTENDED RELEASE ORAL
Status: COMPLETED | OUTPATIENT
Start: 2017-08-16 | End: 2017-08-16

## 2017-08-16 RX ORDER — GABAPENTIN 100 MG/1
100 CAPSULE ORAL 2 TIMES DAILY
Status: DISCONTINUED | OUTPATIENT
Start: 2017-08-16 | End: 2017-08-17 | Stop reason: HOSPADM

## 2017-08-16 RX ORDER — CEFAZOLIN SODIUM IN 0.9 % NACL 2 G/50 ML
2 INTRAVENOUS SOLUTION, PIGGYBACK (ML) INTRAVENOUS EVERY 8 HOURS
Status: COMPLETED | OUTPATIENT
Start: 2017-08-16 | End: 2017-08-17

## 2017-08-16 RX ORDER — DIPHENHYDRAMINE HCL 25 MG
25-50 CAPSULE ORAL
Status: DISCONTINUED | OUTPATIENT
Start: 2017-08-16 | End: 2017-08-17 | Stop reason: HOSPADM

## 2017-08-16 RX ORDER — ACETAMINOPHEN 500 MG
1000 TABLET ORAL EVERY 6 HOURS
Qty: 120 TAB | Refills: 0 | Status: SHIPPED | OUTPATIENT
Start: 2017-08-17 | End: 2019-05-10

## 2017-08-16 RX ORDER — TRANEXAMIC ACID 100 MG/ML
INJECTION, SOLUTION INTRAVENOUS AS NEEDED
Status: DISCONTINUED | OUTPATIENT
Start: 2017-08-16 | End: 2017-08-16 | Stop reason: HOSPADM

## 2017-08-16 RX ORDER — MORPHINE SULFATE 10 MG/ML
INJECTION, SOLUTION INTRAMUSCULAR; INTRAVENOUS AS NEEDED
Status: DISCONTINUED | OUTPATIENT
Start: 2017-08-16 | End: 2017-08-16 | Stop reason: HOSPADM

## 2017-08-16 RX ORDER — ASPIRIN 325 MG
325 TABLET, DELAYED RELEASE (ENTERIC COATED) ORAL EVERY 12 HOURS
Status: DISCONTINUED | OUTPATIENT
Start: 2017-08-16 | End: 2017-08-17 | Stop reason: HOSPADM

## 2017-08-16 RX ORDER — DIPHENHYDRAMINE HYDROCHLORIDE 50 MG/ML
12.5 INJECTION, SOLUTION INTRAMUSCULAR; INTRAVENOUS
Status: DISCONTINUED | OUTPATIENT
Start: 2017-08-16 | End: 2017-08-16 | Stop reason: HOSPADM

## 2017-08-16 RX ORDER — SODIUM CHLORIDE, SODIUM LACTATE, POTASSIUM CHLORIDE, CALCIUM CHLORIDE 600; 310; 30; 20 MG/100ML; MG/100ML; MG/100ML; MG/100ML
75 INJECTION, SOLUTION INTRAVENOUS CONTINUOUS
Status: DISCONTINUED | OUTPATIENT
Start: 2017-08-16 | End: 2017-08-16 | Stop reason: HOSPADM

## 2017-08-16 RX ORDER — MIDAZOLAM HYDROCHLORIDE 1 MG/ML
INJECTION, SOLUTION INTRAMUSCULAR; INTRAVENOUS AS NEEDED
Status: DISCONTINUED | OUTPATIENT
Start: 2017-08-16 | End: 2017-08-16 | Stop reason: HOSPADM

## 2017-08-16 RX ORDER — LOSARTAN POTASSIUM 50 MG/1
100 TABLET ORAL DAILY
Status: DISCONTINUED | OUTPATIENT
Start: 2017-08-17 | End: 2017-08-17 | Stop reason: HOSPADM

## 2017-08-16 RX ORDER — NEOMYCIN AND POLYMYXIN B SULFATES 40; 200000 MG/ML; [USP'U]/ML
SOLUTION IRRIGATION AS NEEDED
Status: DISCONTINUED | OUTPATIENT
Start: 2017-08-16 | End: 2017-08-16 | Stop reason: HOSPADM

## 2017-08-16 RX ORDER — FLUMAZENIL 0.1 MG/ML
0.2 INJECTION INTRAVENOUS AS NEEDED
Status: DISCONTINUED | OUTPATIENT
Start: 2017-08-16 | End: 2017-08-16 | Stop reason: HOSPADM

## 2017-08-16 RX ORDER — DIPHENHYDRAMINE HCL 25 MG
25 CAPSULE ORAL
Status: DISCONTINUED | OUTPATIENT
Start: 2017-08-16 | End: 2017-08-17 | Stop reason: HOSPADM

## 2017-08-16 RX ORDER — PROPOFOL 10 MG/ML
INJECTION, EMULSION INTRAVENOUS
Status: DISCONTINUED | OUTPATIENT
Start: 2017-08-16 | End: 2017-08-16 | Stop reason: HOSPADM

## 2017-08-16 RX ORDER — DEXAMETHASONE SODIUM PHOSPHATE 4 MG/ML
INJECTION, SOLUTION INTRA-ARTICULAR; INTRALESIONAL; INTRAMUSCULAR; INTRAVENOUS; SOFT TISSUE AS NEEDED
Status: DISCONTINUED | OUTPATIENT
Start: 2017-08-16 | End: 2017-08-16 | Stop reason: HOSPADM

## 2017-08-16 RX ORDER — HYDROMORPHONE HYDROCHLORIDE 1 MG/ML
1 INJECTION, SOLUTION INTRAMUSCULAR; INTRAVENOUS; SUBCUTANEOUS
Status: DISCONTINUED | OUTPATIENT
Start: 2017-08-16 | End: 2017-08-17 | Stop reason: HOSPADM

## 2017-08-16 RX ORDER — ONDANSETRON 8 MG/1
8 TABLET, ORALLY DISINTEGRATING ORAL
Status: DISCONTINUED | OUTPATIENT
Start: 2017-08-16 | End: 2017-08-17 | Stop reason: HOSPADM

## 2017-08-16 RX ORDER — ONDANSETRON 2 MG/ML
INJECTION INTRAMUSCULAR; INTRAVENOUS AS NEEDED
Status: DISCONTINUED | OUTPATIENT
Start: 2017-08-16 | End: 2017-08-16 | Stop reason: HOSPADM

## 2017-08-16 RX ORDER — METOPROLOL SUCCINATE 25 MG/1
25 TABLET, EXTENDED RELEASE ORAL
Status: DISCONTINUED | OUTPATIENT
Start: 2017-08-16 | End: 2017-08-17 | Stop reason: HOSPADM

## 2017-08-16 RX ORDER — NALOXONE HYDROCHLORIDE 0.4 MG/ML
0.1 INJECTION, SOLUTION INTRAMUSCULAR; INTRAVENOUS; SUBCUTANEOUS
Status: DISCONTINUED | OUTPATIENT
Start: 2017-08-16 | End: 2017-08-16 | Stop reason: HOSPADM

## 2017-08-16 RX ADMIN — LIDOCAINE HYDROCHLORIDE 0.1 ML: 10 INJECTION, SOLUTION INFILTRATION; PERINEURAL at 07:03

## 2017-08-16 RX ADMIN — HYDROMORPHONE HYDROCHLORIDE 2 MG: 2 TABLET ORAL at 22:46

## 2017-08-16 RX ADMIN — ONDANSETRON 4 MG: 2 INJECTION INTRAMUSCULAR; INTRAVENOUS at 09:55

## 2017-08-16 RX ADMIN — ACETAMINOPHEN 1000 MG: 500 TABLET, FILM COATED ORAL at 22:46

## 2017-08-16 RX ADMIN — GABAPENTIN 100 MG: 100 CAPSULE ORAL at 18:33

## 2017-08-16 RX ADMIN — MIDAZOLAM HYDROCHLORIDE 2 MG: 1 INJECTION, SOLUTION INTRAMUSCULAR; INTRAVENOUS at 09:37

## 2017-08-16 RX ADMIN — SODIUM CHLORIDE 100 ML/HR: 900 INJECTION, SOLUTION INTRAVENOUS at 13:00

## 2017-08-16 RX ADMIN — METOPROLOL SUCCINATE 25 MG: 25 TABLET, EXTENDED RELEASE ORAL at 07:34

## 2017-08-16 RX ADMIN — HYDROMORPHONE HYDROCHLORIDE 1 MG: 1 INJECTION, SOLUTION INTRAMUSCULAR; INTRAVENOUS; SUBCUTANEOUS at 14:30

## 2017-08-16 RX ADMIN — DIPHENHYDRAMINE HYDROCHLORIDE 50 MG: 25 CAPSULE ORAL at 22:45

## 2017-08-16 RX ADMIN — BUPIVACAINE HYDROCHLORIDE 2 ML: 7.5 INJECTION, SOLUTION INTRASPINAL at 09:42

## 2017-08-16 RX ADMIN — DIPHENHYDRAMINE HYDROCHLORIDE 25 MG: 25 CAPSULE ORAL at 16:37

## 2017-08-16 RX ADMIN — SODIUM CHLORIDE, SODIUM LACTATE, POTASSIUM CHLORIDE, AND CALCIUM CHLORIDE: 600; 310; 30; 20 INJECTION, SOLUTION INTRAVENOUS at 09:34

## 2017-08-16 RX ADMIN — TRANEXAMIC ACID 1000 MG: 100 INJECTION, SOLUTION INTRAVENOUS at 11:03

## 2017-08-16 RX ADMIN — CEFAZOLIN 2 G: 1 INJECTION, POWDER, FOR SOLUTION INTRAMUSCULAR; INTRAVENOUS; PARENTERAL at 09:34

## 2017-08-16 RX ADMIN — CELECOXIB 200 MG: 200 CAPSULE ORAL at 22:46

## 2017-08-16 RX ADMIN — CEFAZOLIN 2 G: 1 INJECTION, POWDER, FOR SOLUTION INTRAMUSCULAR; INTRAVENOUS; PARENTERAL at 18:33

## 2017-08-16 RX ADMIN — TRANEXAMIC ACID 1000 MG: 100 INJECTION, SOLUTION INTRAVENOUS at 09:55

## 2017-08-16 RX ADMIN — ASPIRIN 325 MG: 325 TABLET, DELAYED RELEASE ORAL at 22:46

## 2017-08-16 RX ADMIN — DEXAMETHASONE SODIUM PHOSPHATE 10 MG: 4 INJECTION, SOLUTION INTRA-ARTICULAR; INTRALESIONAL; INTRAMUSCULAR; INTRAVENOUS; SOFT TISSUE at 09:55

## 2017-08-16 RX ADMIN — TUBERCULIN PURIFIED PROTEIN DERIVATIVE 5 UNITS: 5 INJECTION, SOLUTION INTRADERMAL at 07:03

## 2017-08-16 RX ADMIN — PROPOFOL 25 MCG/KG/MIN: 10 INJECTION, EMULSION INTRAVENOUS at 09:50

## 2017-08-16 RX ADMIN — VANCOMYCIN HYDROCHLORIDE 1250 MG: 10 INJECTION, POWDER, LYOPHILIZED, FOR SOLUTION INTRAVENOUS at 08:49

## 2017-08-16 RX ADMIN — ACETAMINOPHEN 1000 MG: 10 INJECTION, SOLUTION INTRAVENOUS at 16:39

## 2017-08-16 RX ADMIN — METOPROLOL SUCCINATE 25 MG: 25 TABLET, EXTENDED RELEASE ORAL at 22:46

## 2017-08-16 RX ADMIN — SODIUM CHLORIDE, SODIUM LACTATE, POTASSIUM CHLORIDE, AND CALCIUM CHLORIDE 1000 ML: 600; 310; 30; 20 INJECTION, SOLUTION INTRAVENOUS at 07:03

## 2017-08-16 NOTE — PERIOP NOTES
Betadine lavage:  17.5cc of betadine lot # [id number] , exp. Date 04/2019 ,  in 500cc of . 9NS Lot # [id number] , exp.  Date : 04/2020

## 2017-08-16 NOTE — H&P
The patient presents today for right total hip arthroplasty. The patient has end stage arthritis of the right hip. The patient was seen previously in the office and there has been no changes to the patient's conditions. The patient continues to have predominately anterior groin pain with activities including putting on socks and shoes and transitioning from a seated to a standing position including getting out of a car and getting up from a chair. Prior physical exam is consistent with symptomatic right hip arthritis with anterior groin pain with resisted leg raise and FADIR testing. The patient has attempted conservative treatment for this condition including lifestyle modifications and anti-inflammatories. The patient has pain practically daily which affects their quality of life and activities of daily living. The risks, benefits, and alternatives have previously been discussed with the patient including but not limited to infection, bleeding, damage to nerves and blood vessels with particular attention given to risk of damage of the femoral, obturator, lateral femoral cutaneous and sciatic nerve, risk of dislocation, fracture both intra-op and post-op, limb length inequality, polyethylene wear, implant loosening, risk for revision surgery should any of the above occur, venous thrombo-embolism including deep vein thrombosis and pulmonary embolism, and potential for continued pain after surgery. We have also previously discussed the potential of morbidity and mortality associated with, but not limited to, the actual surgical procedure, anesthesia, prior medical conditions, and/or the administration of medications perioperatively. The patient has been given the opportunity to ask questions all of which have been answered and the patient wishes to proceed. The necessity for right total hip is still present, and the H&P is still current.

## 2017-08-16 NOTE — PROGRESS NOTES
Problem: Self Care Deficits Care Plan (Adult)  Goal: *Acute Goals and Plan of Care (Insert Text)  GOALS:   DISCHARGE GOALS (in preparation for going home/rehab): 3 days  1. Mr. Fabby Velasco will perform one lower body dressing activity with minimal assistance with adaptive equipment to demonstrate improved functional mobility and safety. 2. Mr. Fabby Velasco will perform one lower body bathing activity with minimal assistance with adaptive equipment to demonstrate improved functional mobility and safety. 3. Mr. Fabby Velasco will perform toileting/toilet transfer with contact guard assistance with adaptive equipment to demonstrate improved functional mobility and safety. 4. Mr. Fabby Velasco will perform shower transfer with contact guard assistance with adaptive equipment to demonstrate improved functional mobility and safety. 5. Mr. Fabby Velasco will state SHANTANU precautions with two verbal cues to demonstrate improved functional mobility and safety. JOINT CAMP OCCUPATIONAL THERAPY SHANTANU: Initial Assessment and PM 8/16/2017  INPATIENT: Hospital Day: 1  [place] / Product Type: Medicare /      NAME/AGE/GENDER: Nitin Morse is a 66 y.o. male        PRIMARY DIAGNOSIS:  Localized osteoarthrosis of right hip [M16.11]              Procedure(s) and Anesthesia Type:     * HIP ARTHROPLASTY TOTAL/ RIGHT - Spinal (Right)  ICD-10: Treatment Diagnosis:        · Pain in Right Hip (M25.551)  · Stiffness of Right Hip, Not elsewhere classified (M25.651)  · Other lack of cordination (R27.8)       ASSESSMENT:      Mr. Fabby Velasco is s/p right SHANTANU and presents with decreased weight bearing on right LE and decreased independence with functional mobility and activities of daily living. Patient would benefit from skilled Occupational Therapy to maximize independence and safety with self-care task and functional mobility.   Pt would also benefit from education on lower body adaptive equipment and hip precautions post-surgery in preparation for going home or for recommendations for post-hospital rehab program.  Patient plans for further rehab at home with home health services and good family support. OT reviewed therapy schedule and plan of care with patient. Patient was able to transfer and perform self care skills as charted below. Patient instructed to call for assistance when needing to get up from the bed and all needs in reach. Patient verbalized understanding of call light. This section established at most recent assessment   PROBLEM LIST (Impairments causing functional limitations):  1. Decreased Strength  2. Decreased ADL/Functional Activities  3. Decreased Transfer Abilities  4. Increased Pain  5. Increased Fatigue  6. Decreased Flexibility/Joint Mobility  7. Decreased Knowledge of Precautions    INTERVENTIONS PLANNED: (Benefits and precautions of occupational therapy have been discussed with the patient.)  1. Activities of daily living training  2. Adaptive equipment training  3. Balance training  4. Clothing management  5. Donning&doffing training  6. Theraputic activity      TREATMENT PLAN: Frequency/Duration: Follow patient 1 time to address above goals. Rehabilitation Potential For Stated Goals: GOOD      RECOMMENDED REHABILITATION/EQUIPMENT: (at time of discharge pending progress): Continue Skilled Therapy and Home Health: Physical Therapy. OCCUPATIONAL PROFILE AND HISTORY:   History of Present Injury/Illness (Reason for Referral): Pt presents this date s/p (right) SHANTANU. Past Medical History/Comorbidities:   Mr. Flaquita Pitts  has a past medical history of Adverse effect of anesthesia; Environmental and seasonal allergies; HTN (hypertension); Insomnia; Osteoarthritis; and RBBB. Mr. Flaquita Pitts  has a past surgical history that includes tonsillectomy (1950's); ankle fracture tx (Left, 07/2012); rotator cuff repair (Right, 11/2012); and knee arthroscopy (Left, 2016).   Social History/Living Environment:   Home Environment: Private residence  # Steps to Enter: 2  Rails to Enter: No  One/Two Story Residence: One story  Living Alone: No  Support Systems: Spouse/Significant Other/Partner  Patient Expects to be Discharged to[de-identified] Private residence  Tub or Shower Type: Shower  Prior Level of Function/Work/Activity:  Mod I with ADLs      Number of Personal Factors/Comorbidities that affect the Plan of Care: Brief history (0):  LOW COMPLEXITY   ASSESSMENT OF OCCUPATIONAL PERFORMANCE[de-identified]   Most Recent Physical Functioning:            Patient Vitals for the past 6 hrs:       BP BP Patient Position SpO2 O2 Flow Rate (L/min) Pulse   08/16/17 1118 94/51 Supine 94 % 3 l/min (!) 57   08/16/17 1123 95/53 - 97 % - 64   08/16/17 1128 100/55 - 98 % - 65   08/16/17 1133 114/65 - 100 % 3 l/min 63   08/16/17 1148 119/59 - 100 % 2 l/min 68   08/16/17 1203 132/75 - 100 % 2 l/min 63   08/16/17 1215 136/73 - 100 % - (!) 59   08/16/17 1245 136/81 - 100 % - 62   08/16/17 1311 - - 99 % 0 l/min -   08/16/17 1316 141/74 At rest 100 % - (!) 58                       Coordination  Fine Motor Skills-Upper: Left Intact; Right Intact  Gross Motor Skills-Upper: Left Intact; Right Intact           Mental Status  Neurologic State: Alert; Appropriate for age  Orientation Level: Appropriate for age  Cognition: Appropriate decision making; Appropriate for age attention/concentration; Appropriate safety awareness; Follows commands  Perception: Appears intact  Perseveration: No perseveration noted  Safety/Judgement: Awareness of environment; Fall prevention                    Basic ADLs (From Assessment) Complex ADLs (From Assessment)   Basic ADL  Feeding: Setup  Oral Facial Hygiene/Grooming: Supervision  Bathing: Moderate assistance  Upper Body Dressing: Supervision  Lower Body Dressing: Moderate assistance  Toileting:  Total assistance (catheter)     Grooming/Bathing/Dressing Activities of Daily Living     Cognitive Retraining  Safety/Judgement: Awareness of environment; Fall prevention                 Functional Transfers  Toilet Transfer : Minimum assistance  Shower Transfer: Minimum assistance                 Physical Skills Involved:  1. Balance  2. Strength  3. Activity Tolerance Cognitive Skills Affected (resulting in the inability to perform in a timely and safe manner): 1. none Psychosocial Skills Affected:  1. safety awareness   Number of elements that affect the Plan of Care: 3-5:  MODERATE COMPLEXITY   CLINICAL DECISION MAKIN[address] [place] [address] AM-PAC 6 Clicks   Daily Activity Inpatient Short Form  How much help from another person does the patient currently need. .. Total A Lot A Little None   1. Putting on and taking off regular lower body clothing?   [ ] 1   [X] 2   [ ] 3   [ ] 4   2. Bathing (including washing, rinsing, drying)? [ ] 1   [X] 2   [ ] 3   [ ] 4   3. Toileting, which includes using toilet, bedpan or urinal?   [X] 1   [ ] 2   [ ] 3   [ ] 4   4. Putting on and taking off regular upper body clothing?   [ ] 1   [ ] 2   [X] 3   [ ] 4   5. Taking care of personal grooming such as brushing teeth? [ ] 1   [ ] 2   [X] 3   [ ] 4   6. Eating meals? [ ] 1   [ ] 2   [ ] 3   [X] 4   © , Trustees of [address] [place] [address], under license to [employer]. All rights reserved   Score:  Initial: 15 Most Recent: X (Date: -- )     Interpretation of Tool:  Represents activities that are increasingly more difficult (i.e. Bed mobility, Transfers, Gait).        Score 24 23 22-20 19-15 14-10 9-7 6       Modifier CH CI CJ CK CL CM CN         · Self Care:               - CURRENT STATUS:    CK - 40%-59% impaired, limited or restricted               - GOAL STATUS:           CJ - 20%-39% impaired, limited or restricted               - D/C STATUS:                       ---------------To be determined---------------  [place] / Product Type: Medicare /       Medical Necessity:     · Patient is expected to demonstrate progress in balance, coordination and functional technique to decrease assistance required with self care and functional mobility and improve safety during self care and functional mobility. Reason for Services/Other Comments:  · Patient continues to require skilled intervention due to decreased self care and functional mobility. Use of outcome tool(s) and clinical judgement create a POC that gives a: LOW COMPLEXITY                 TREATMENT:   (In addition to Assessment/Re-Assessment sessions the following treatments were rendered)      Pre-treatment Symptoms/Complaints:  Just had pain meds per patient report  Pain: Initial:   Pain Intensity 1: 0  Post Session:  0/10 rest      Assessment/Reassessment only, no treatment provided today     Treatment/Session Assessment:         Response to Treatment:  Tolerated well plans to go home tomorrow. Education:  [ ] Home Exercises  [X] Fall Precautions  [X] Hip Precautions [ ] Going Home Video  [ ] Knee/Hip Prosthesis Review  [X] Walker Management/Safety [X] Adaptive Equipment as Needed         Interdisciplinary Collaboration:   · Physical Therapist  · Occupational Therapist  · Registered Nurse     After treatment position/precautions:   · Supine in bed  · Bed alarm/tab alert on  · Bed/Chair-wheels locked  · Caregiver at bedside  · Call light within reach  · RN notified  · Side rails x 3     Compliance with Program/Exercises: compliant all of the time. Recommendations/Intent for next treatment session:  Treatment next visit will focus on increasing Mr. Glez's independence with bed mobility, transfers, self care, functional mobility, modalities for pain, and patient education.        Total Treatment Duration:  OT Patient Time In/Time Out  Time In: 1440  Time Out: 1200 E Skylar S OT

## 2017-08-16 NOTE — PROGRESS NOTES
TRANSFER - IN REPORT:    Verbal report received from [facility] (name) on [place]  being received from PACU(unit) for routine post - op      Report consisted of patients Situation, Background, Assessment and   Recommendations(SBAR). Information from the following report(s) SBAR, OR Summary, Procedure Summary, Intake/Output and MAR was reviewed with the receiving nurse. Opportunity for questions and clarification was provided. Assessment completed upon patients arrival to unit and care assumed.

## 2017-08-16 NOTE — PERIOP NOTES
TRANSFER - IN REPORT:    Verbal report received from Paul Huang RN on Jennifer Callahan  being received from [facility] for routine progression of care      Report consisted of patients Situation, Background, Assessment and   Recommendations(SBAR). Information from the following report(s) SBAR and MAR was reviewed with the receiving nurse. Opportunity for questions and clarification was provided. Assessment completed upon patients arrival to unit and care assumed.

## 2017-08-16 NOTE — ANESTHESIA PROCEDURE NOTES
Spinal Block    Start time: 8/16/2017 9:40 AM  End time: 8/16/2017 9:43 AM  Performed by: Anastacia Roblero  Authorized by: Anastacia Roblero     Pre-procedure:   Indications: primary anesthetic  Preanesthetic Checklist: patient identified, risks and benefits discussed, anesthesia consent, site marked, patient being monitored and timeout performed    Timeout Time: 09:39          Spinal Block:   Patient Position:  Seated  Prep Region:  Lumbar  Prep: chlorhexidine      Location:  L3-4  Technique:  Single shot  Local:  Lidocaine 1%  Local Dose (mL):  3    Needle:   Needle Type:  Pencil-tip  Needle Gauge:  25 G  Attempts:  1      Events: CSF confirmed, no blood with aspiration and no paresthesia        Assessment:  Insertion:  Uncomplicated  Patient tolerance:  Patient tolerated the procedure well with no immediate complications

## 2017-08-16 NOTE — OP NOTES
Total Hip Procedure Note    Emely Lin,  [id number],  1945    Pre-operative Diagnosis:  Localized osteoarthrosis of right hip [M16.11]    Post-operative Diagnosis: Localized osteoarthrosis of right hip [M16.11]    Procedure: Right Total Hip Arthroplasty    BMI: Body mass index is 27.84 kg/(m^2). Alvaro Carroll Location: [address] [place] [address]    Surgeon: Radha Dobbs MD    Assistant: None    Anesthesia: Spinal     Complications: None    EBL: 200cc    Drains: None    Intra-op Findings: Pre-operatively leg lengths were assessed with the patient in the lateral decubitus position and operative leg was felt to be 5mm shorter than contralateral leg. The  right hip showed notable cartilage loss of both the femoral head and acetabulum with associated osteophyte formation. No intra-operative periprosthetic fracture was encountered. Sciatic nerve was noted to be intact at the end of the procedure. We measured the distance of our resected femoral head/neck from the cut surface to the center of the femoral head to be approximately 35mm. The overall length replaced with the implanted head/neck was 35mm. Intra-operatively we felt that we restored the patients leg lengths to equal lengths using the method described later. Postop with the patient supine we assessed leg lengths and felt they were similar. Patient condition at completion of Procedure: Stable    Implants:   Implant Name Type Inv.  Item Serial No.  Lot No. LRB No. Used   SHELL TRITAN FRANCISCO CLSTR H 56MM --  - [id number]  SHELL TRITAN FRANCISCO CLSTR H 56MM --  [id number] [employer] [facility] [id number] Right 1   LINER ACET TRIDENT 10 DEG SZ E --  - [id number]  LINER ACET TRIDENT 10 DEG SZ E --  [id number] [employer] [facility] [id number] Right 1   STEM FEM SZ 5 127D [id number] -- ACCOLADE II V40 - [id number]  STEM FEM SZ 5 127D [id number] -- ACCOLADE II V40 [id number] [employer] [facility] [id number] Right 1   HEAD FEM DELT V40 +0MM NK 36MM -- V40 BIOLOX - [id number]   HEAD FEM DELT V40 +0MM NK 36MM -- V40 BIOLOX [id number] [facility] [employer] HOWM [id number] Right 1       Description of Procedure    The complexity of the procedure required the use of a skilled first assistant for positioning, retraction and assistance in closure. Trace Hui was brought to the operating room. Patient was transferred from the stretcher to OR bed. Spinal anesthetic was induced. Gleason catheter was placed. IV antibiotics were administered per protocol. Trace Hui was positioned in the left lateral decubitus position. The limb was prepped and draped in the usual sterile fashion. A time out identifying the patient, procedure, operative side and surgeon was administered and charted by the OR Nurse. Prior to incision one gram of TXA was given intravenously. A standard posterior approach was utilized to expose the hip. The incision was carried through the subcutaneous tissue and underlying fascia with hemostasis obtained using the bovie cauterization and Aquamantys. A Charmley retractor was inserted. We resected any redundent bursal tissue off the external rotators. We were able to identify the piriformis tendon. The short external rotators and capsule were dissected off the posterior femur as a single layer just superior to the piriformis tendon. The sciatic nerve was palpated and protected during dissection. The femoral head was dislocated. The articular surface revealed loss of cartilage, exposed bone and bone spurring. The neck was osteotomized approximately 1cm above the top of the lesser trochanter. We were careful to protect the greater trochanter during osteotomy and protect it from iatrogenic injury. Acetabular retractors were placed both anterior and posterior just superficial to the acetabular labrum. Remaining acetabular labrum was resected and any soft tissue was removed from the acetabulum including any tissue within the codyloid fossa.  The acetabular surface revealed loss of cartilage with exposed bone. The acetabulum was sequentially reamed noting proper anteversion and inclination during reaming. The transverse acetabular ligament was used as the primary anatomic landmark to determine version and inclination. The acetabulum was sized to a 56 mm acetabular component. The prepared acetabulum was irrigated of any residual reamings and soft tissue. The permanent acetabular component was impacted into place to achieve appropriate horizontal tilt, anteversion, bone coverage and stability. We visualize that the acetabular component was fully seated. A trial poly liner was impacted into position. We did not have to excise overhanging osteophytes anterior and posterior  in order to minimize the risk of impingement. We then turned our attention to the proximal femur. A 2-prong proximal femoral retractor was placed. We gained access to the proximal femur using a  and femoral canal finder. The canal was prepared with appropriate lateralization in which we used the initial smaller broaches to remove lateral bone to avoid varus placement of the femoral component. The canal was then broached progressively. The broach was positioned with the appropriate anatomic femoral anteversion. We broached up to a size 5 Accolade II stem. A trial reduction with a size #5 127 degree Accolade II stem with a +0mm neck length and 36 mm head was performed. This was found to be the most stable to flexion greater than 90 degrees and on internal and external rotation. Limb lengths were assessed using three techniques. First, the position of the tip of the operative greater trochanter was compared to the center of the femoral head component and was felt to match this same anatomic relationship as the non-operative hip based on preoperative X-rays.  Next, we measured the length of our resected femoral head neck and felt that the trial components matched this resected length as closely as possible when accounting for the length provided by the femoral neck/head. Finally, we compared leg lengths by comparing the possible of the patella with the patients heels together with the patient in the lateral decubitus position. Using these methods it was felt that the patients leg lengths were equilibrated and with appropriate stability as mentioned above. All trial components were removed. The final poly liner was impacted into place which was a 10 degree elevated liner. A cementless size 5 127 degree Accolade II stem was impacted into place carefully. We were careful to observe the calcar region for any iatrogenic fractures during insertion. The permanent femoral head was impacted into place which was a +0mm 36mm ceramic head. Cristy Glez's hip was reduced and stability was as mentioned above. Dilute Betadine solution was allowed to sit in the surgical site for a 3 minute period and copious saline was used to irrigate the surgical site. The sciatic nerve was palpated and noted to be intact. A periarticular of ropivicaine, toradol, and morphine was injected about the surgical site with care being taken not to inject in the vicinity of neurovascular structures. Prior to wound closure one additional gram of TXA was given for a total of 2 grams. The capsule was repaired with a three #2 Fiberwires secured through drill holes placed in the posterior aspect of the trochanter. No drain was placed. The fascia was closed with a  #0 Bidirectional Stratafix barbed suture. The sub-Q layer was closed with 2-0 Vicryl suture and a  3-0 moncril stratafix suture in a running subcuticular fashion was used to close the skin. The incision site was thoroughly cleaned with alcohol and the Prineo wound closure system was applied to seal it off from external contamination after the overlying skin was thoroughly cleaned with alcohol.   A thin layer of [place] lubricant was applied over the wound to keep the dressing from adhering to the overlying sterile bandage and said bandage was placed. Drapes were then broken down and patient was transferred carefully back to the OR stretcher. The sponge and needle counts were correct. The patient tolerated the procedure without difficulty and left the operating room in satisfactory condition.     Signed By: Vilma Conley MD

## 2017-08-16 NOTE — PROGRESS NOTES
Problem: Mobility Impaired (Adult and Pediatric)  Goal: *Acute Goals and Plan of Care (Insert Text)  GOALS (1-4 days):  (1.)Mr. Glez will move from supine to sit and sit to supine in bed with SUPERVISION. (2.)Mr. Glez will transfer from bed to chair and chair to bed with STAND BY ASSIST using the least restrictive device. (3.)Mr. Glez will ambulate with STAND BY ASSIST for 200 feet with the least restrictive device. (4.)Mr. Glez will ambulate up/down 2 steps with No railing with MINIMAL ASSIST with no device. (5.)Mr. Glez will state/observe SHANTANU precautions with No verbal cues. ________________________________________________________________________________________________      PHYSICAL THERAPY JOINT CAMP SHANTANU: INITIAL ASSESSMENT, TREATMENT DAY: DAY OF ASSESSMENT, PM 8/16/2017  INPATIENT: Hospital Day: 1  [place] / Product Type: Medicare /      NAME/AGE/GENDER: Fabián Deutsch is a 66 y.o. male        PRIMARY DIAGNOSIS:  Localized osteoarthrosis of right hip [M16.11]              Procedure(s) and Anesthesia Type:     * HIP ARTHROPLASTY TOTAL/ RIGHT - Spinal (Right)  ICD-10: Treatment Diagnosis:        · Pain in Right Hip (M25.551)  · Stiffness of Right Hip, Not elsewhere classified (M25.651)  · Difficulty in walking, Not elsewhere classified (R26.2)       ASSESSMENT:      Mr. Jazmyn Jules presents with impaired strength & mobility s/p right SHANTANU. Pt also had decreased stability during out of bed activity. Pt will benefit from follow up therapy to help restore safe function prior to returning home with caregiver      This section established at most recent assessment   PROBLEM LIST (Impairments causing functional limitations):  1. Decreased Strength  2. Decreased ADL/Functional Activities  3. Decreased Transfer Abilities  4. Decreased Ambulation Ability/Technique  5. Decreased Balance  6. Increased Pain  7. Decreased Activity Tolerance  8.  Decreased Knowledge of Precautions  9. Decreased [place] with Home Exercise Program    INTERVENTIONS PLANNED: (Benefits and precautions of physical therapy have been discussed with the patient.)  1. Bed Mobility  2. Gait Training  3. Home Exercise Program (HEP)  4. Therapeutic Exercise/Strengthening  5. Transfer Training  6. Range of Motion: active/assisted/passive  7. Therapeutic Activities  8. Group Therapy      TREATMENT PLAN: Frequency/Duration: Follow patient BID   to address above goals. Rehabilitation Potential For Stated Goals: GOOD      RECOMMENDED REHABILITATION/EQUIPMENT: (at time of discharge pending progress): Continue Skilled Therapy and Home Health: Physical Therapy. HISTORY:   History of Present Injury/Illness (Reason for Referral): The patient has end stage arthritis of the right hip. The patient was evaluated and examined in the office prior to today and was found to have a history on physical exam consistent with intra-articular pathology of the right hip. Patient complains of anterior groin pain predominately. Pain occurs during activity. Patient has difficulty putting on socks/shoes and has notable pain when getting up from a chair and getting out of a car. Patient does not complain of significant lateral hip pain or radicular pain down the leg. There have been no changes to the patient's orthopedic condition since the last office visit     Past Medical History/Comorbidities:   Mr. Abdirashid Culver  has a past medical history of Adverse effect of anesthesia; Environmental and seasonal allergies; HTN (hypertension); Insomnia; Osteoarthritis; and RBBB. Mr. Abdirashid Culver  has a past surgical history that includes tonsillectomy (1950's); ankle fracture tx (Left, 07/2012); rotator cuff repair (Right, 11/2012); and knee arthroscopy (Left, 2016).   Social History/Living Environment:   Home Environment: Private residence  # Steps to Enter: 2  Rails to Enter: No  One/Two Story Residence: One story  Living Alone: No  Support Systems: Spouse/Significant Other/Partner  Patient Expects to be Discharged to[de-identified] Private residence  Current DME Used/Available at Home: Commode, bedside, Shower chair, Walker, rolling  Tub or Shower Type: Shower  Prior Level of Function/Work/Activity:  Pt was independent without an assistive device prior to this admission   Number of Personal Factors/Comorbidities that affect the Plan of Care: 1-2: MODERATE COMPLEXITY   EXAMINATION:   Most Recent Physical Functioning:   Gross Assessment: Yes  Gross Assessment  AROM: Within functional limits (left LE)  Strength: Within functional limits (left LE)  Coordination: Within functional limits (left LE)                        Bed Mobility  Supine to Sit: Contact guard assistance  Sit to Supine: Contact guard assistance  Scooting: Contact guard assistance     Transfers  Sit to Stand: Minimum assistance  Stand to Sit: Minimum assistance  Bed to Chair:  (NT)     Balance  Sitting: Intact; Without support  Standing: Impaired; With support (walker)                Weight Bearing Status  Right Side Weight Bearing: As tolerated  Distance (ft): 5 Feet (ft)  Ambulation - Level of Assistance: Minimal assistance  Assistive Device: Walker, rolling  Speed/Gretta: Shuffled; Slow  Step Length: Left shortened;Right shortened  Stance: Right decreased  Gait Abnormalities: Antalgic;Decreased step clearance         Braces/Orthotics: none             Body Structures Involved:  1. Joints  2. Muscles Body Functions Affected:  1. Sensory/Pain  2. Movement Related Activities and Participation Affected:  1. General Tasks and Demands  2. Mobility   Number of elements that affect the Plan of Care: 4+: HIGH COMPLEXITY   CLINICAL PRESENTATION:   Presentation: Stable and uncomplicated: LOW COMPLEXITY   CLINICAL DECISION MAKIN [facility] [address] [id number] AM-PAC 6 Clicks   Basic Mobility Inpatient Short Form  How much difficulty does the patient currently have. .. Unable A Lot A Little None   1. Turning over in bed (including adjusting bedclothes, sheets and blankets)? [ ] 1   [ ] 2   [X] 3   [ ] 4   2. Sitting down on and standing up from a chair with arms ( e.g., wheelchair, bedside commode, etc.)   [ ] 1   [ ] 2   [X] 3   [ ] 4   3. Moving from lying on back to sitting on the side of the bed? [ ] 1   [ ] 2   [X] 3   [ ] 4   How much help from another person does the patient currently need. .. Total A Lot A Little None   4. Moving to and from a bed to a chair (including a wheelchair)? [ ] 1   [ ] 2   [X] 3   [ ] 4   5. Need to walk in hospital room? [ ] 1   [ ] 2   [X] 3   [ ] 4   6. Climbing 3-5 steps with a railing? [X] 1   [ ] 2   [ ] 3   [ ] 4   © 2007, Trustees of [address] [place] [address], under license to [employer]. All rights reserved       Score:  Initial: 16 Most Recent: X (Date: -- )     Interpretation of Tool:  Represents activities that are increasingly more difficult (i.e. Bed mobility, Transfers, Gait). Score 24 23 22-20 19-15 14-10 9-7 6       Modifier CH CI CJ CK CL CM CN         · Mobility - Walking and Moving Around:               - CURRENT STATUS:    CK - 40%-59% impaired, limited or restricted               - GOAL STATUS:           CJ - 20%-39% impaired, limited or restricted               - D/C STATUS:                       ---------------To be determined---------------  [place] / Product Type: Medicare /       Medical Necessity:     · Patient is expected to demonstrate progress in strength, balance, coordination and functional technique to decrease assistance required with bed mobility, transfers & gait. Reason for Services/Other Comments:  · Patient continues to require skilled intervention due to pt not independent with functional mobility.    Use of outcome tool(s) and clinical judgement create a POC that gives a: Clear prediction of patient's progress: LOW COMPLEXITY                 TREATMENT:   (In addition to Assessment/Re-Assessment sessions the following treatments were rendered)      Pre-treatment Symptoms/Complaints:  none  Pain: Initial: visual scale  Pain Intensity 1: 0  Post Session:  0/10      Assessment/Reassessment only, no treatment provided today        Date:    Date:    Date:      ACTIVITY/EXERCISE AM PM AM PM AM PM   GROUP THERAPY  [ ]  [ ]  [ ]  [ ]  [ ]  [ ]   Ankle Pumps               Quad Sets               Gluteal Sets               Hip ABd/ADduction               Straight Leg Raises               Knee Slides               Short Arc Quads               Long Arc Quads               Chair Slides                               B = bilateral; AA = active assistive; A = active; P = passive       Treatment/Session Assessment:         Response to Treatment:  Tolerated well. Education:  [ ] Home Exercises  [X] Fall Precautions  [X] Hip Precautions [ ] D/C Instruction Review  [ ] Knee/Hip Prosthesis Review  [X] Walker Management/Safety [ ] Adaptive Equipment as Needed         Interdisciplinary Collaboration:   · Occupational Therapist  · Registered Nurse     After treatment position/precautions:   · Supine in bed  · Bed/Chair-wheels locked  · Bed in low position  · Caregiver at bedside  · Call light within reach  · RN notified  · Family at bedside     Compliance with Program/Exercises: Will assess as treatment progresses. Recommendations/Intent for next treatment session:  Treatment next visit will focus on increasing Mr. Patels independence with bed mobility, transfers, gait training, strength/ROM exercises, modalities for pain, and patient education.        Total Treatment Duration:  PT Patient Time In/Time Out  Time In: 1430  Time Out: [address] 3 Marichuy Rodriguez PT

## 2017-08-16 NOTE — PERIOP NOTES
TRANSFER - OUT REPORT:    Verbal report given to JEN Osuna on Bina Mehta  being transferred to preop holding for routine progression of care       Report consisted of patients Situation, Background, Assessment and   Recommendations(SBAR). Information from the following report(s) SBAR, MAR and Recent Results was reviewed with the receiving nurse. Lines:   Peripheral IV 58/51/15 Right Cephalic (Active)   Site Assessment Clean, dry, & intact 8/16/2017  7:02 AM   Phlebitis Assessment 0 8/16/2017  7:02 AM   Dressing Status Clean, dry, & intact 8/16/2017  7:02 AM   Dressing Type Transparent;Tape 8/16/2017  7:02 AM   Hub Color/Line Status Green; Infusing 8/16/2017  7:02 AM   Action Taken Blood drawn 8/16/2017  7:02 AM        Opportunity for questions and clarification was provided. Patient transported with:   Bill Whalen to give Toprol XL per Dr Junior Correa.

## 2017-08-16 NOTE — IP AVS SNAPSHOT
[address] [place] [address] Rd 
[phone] Patient: Debra Sandy MRN: [id number] You are allergic to the following Allergen Reactions Percocet (Oxycodone-Acetaminophen) Itching Sulfa (Sulfonamide Antibiotics) Hives Immunizations Administered for This Admission Name Date  
 TB Skin Test (PPD) Intradermal 8/16/2017 Recent Documentation Height Weight BMI Smoking Status 1.778 m 88 kg 27.84 kg/m2 Never Smoker Emergency Contacts Name Discharge Info Relation Home Work Mobile [employer] CAREGIVER [3]  [phone] About your hospitalization You were admitted on:  August 16, 2017 You last received care in the:  [facility] You were discharged on:  August 17, 2017 Unit phone number:  [phone] Why you were hospitalized Your primary diagnosis was:  Not on File Your diagnoses also included:  Oa (Osteoarthritis) Of Hip Providers Seen During Your Hospitalizations Provider Role Specialty Primary office phone Nathaniel Pink MD Attending Provider Orthopedic Surgery [phone] Your Primary Care Physician (PCP) Primary Care Physician Office Phone Office Fax [place] [address] [phone] Follow-up Information Follow up With Details Comments Contact Info Danielle Steve MD  As needed [address] [facility] [address] [place] [address] 
[phone] Nathaniel Pink MD  As scheduled by office [address] [facility] [facility] [address] [place] [address] 
[phone] [address]  Will contact you within 48 hrs [address] [place] [address] 
[phone] Current Discharge Medication List  
  
START taking these medications Dose & Instructions Dispensing Information Comments Morning Noon Evening Bedtime acetaminophen 500 mg tablet Commonly known as:  TYLENOL Your next dose is: Today Dose:  1000 mg Take 2 Tabs by mouth every six (6) hours. Quantity:  120 Tab Refills:  0  
     
   
   
  
   
  
 aspirin delayed-release 325 mg tablet Your next dose is: Today Dose:  325 mg Take 1 Tab by mouth every twelve (12) hours every twelve (12) hours. Quantity:  60 Tab Refills:  0  
     
   
   
  
   
  
 celecoxib 200 mg capsule Commonly known as:  CELEBREX Your next dose is: Today Dose:  200 mg Take 1 Cap by mouth every twelve (12) hours every twelve (12) hours for 90 days. Quantity:  60 Cap Refills:  2  
     
   
   
  
   
  
 gabapentin 100 mg capsule Commonly known as:  NEURONTIN Your next dose is: Today Dose:  100 mg Take 1 Cap by mouth two (2) times a day. Quantity:  60 Cap Refills:  0 HYDROmorphone 2 mg tablet Commonly known as:  DILAUDID Your next dose is: Today Dose:  2 mg Take 1 Tab by mouth every four (4) hours as needed. Max Daily Amount: 12 mg. Quantity:  90 Tab Refills:  0  
     
   
   
  
   
  
 ondansetron 8 mg disintegrating tablet Commonly known as:  ZOFRAN ODT Your next dose is: Today Dose:  8 mg Take 1 Tab by mouth every eight (8) hours as needed for Nausea. Quantity:  60 Tab Refills:  0  
     
   
   
  
   
  
 senna-docusate 8.6-50 mg per tablet Commonly known as:  Fabiola [place] Your next dose is:  Tomorrow Dose:  2 Tab Take 2 Tabs by mouth daily. Quantity:  120 Tab Refills:  0 CONTINUE these medications which have NOT CHANGED Dose & Instructions Dispensing Information Comments Morning Noon Evening Bedtime BENADRYL 25 mg capsule Generic drug:  diphenhydrAMINE Your next dose is: Today Dose:  25-50 mg Take 25-50 mg by mouth nightly. Indications: \"for sleep and allergies\" Refills:  0 HAIR,SKIN AND NAILS PO Your next dose is: Today Dose:  3 Tab Take 3 Tabs by mouth every evening. Refills:  0  
     
   
   
  
   
  
 losartan 100 mg tablet Commonly known as:  COZAAR Your next dose is:  Tomorrow Dose:  100 mg Take 100 mg by mouth daily. Indications: hypertension Refills:  0 MELATONIN PO Your next dose is: Today Take  by mouth nightly. Refills:  0  
     
   
   
   
  
  
 metoprolol succinate 25 mg XL tablet Commonly known as:  TOPROL-XL Your next dose is: Today Dose:  25 mg Take 25 mg by mouth nightly. Refills:  0  
     
   
   
   
  
  
 OCUVITE PO Your next dose is: Today Dose:  1 Tab Take 1 Tab by mouth every evening. Refills:  0  
     
   
   
  
   
  
 ONE-A-DAY MEN'S PO Your next dose is: Today Dose:  2 Tab Take 2 Tabs by mouth every evening. Refills:  0  
     
   
   
  
   
  
 OSTEO BI-FLEX PO Your next dose is: Today Dose:  2 Tab Take 2 Tabs by mouth every evening. Refills:  0  
     
   
   
  
   
  
 traMADol 50 mg tablet Commonly known as:  ULTRAM  
Your next dose is:  DO NOT TAKE WITH DILAUDID!!! Dose:  50 mg Take 50 mg by mouth every six (6) hours as needed for Pain. Take / use AM day of surgery  per anesthesia protocols PRN   Indications: Pain Refills:  0 STOP taking these medications ALEVE 220 mg Cap Generic drug:  naproxen sodium  
   
  
 mupirocin calcium 2 % nasal ointment Commonly known as:  [facility] Where to Get Your Medications Information on where to get these meds will be given to you by the nurse or doctor. ! Ask your nurse or doctor about these medications  
  acetaminophen 500 mg tablet  
 aspirin delayed-release 325 mg tablet  
 celecoxib 200 mg capsule  
 gabapentin 100 mg capsule HYDROmorphone 2 mg tablet ondansetron 8 mg disintegrating tablet  
 senna-docusate 8.6-50 mg per tablet Discharge Instructions [employer] Patient Discharge Instructions Emely Lin / [id number] : 1945 Admitted 2017 Discharged: 2017 IF YOU HAVE ANY PROBLEMS ONCE YOU ARE AT HOME CALL THE FOLLOWING NUMBERS:  
Main office number: [phone] Take Home Medications · It is important that you take the medication exactly as they are prescribed. · Keep your medication in the bottles provided by the pharmacist and keep a list of the medication names, dosages, and times to be taken in your wallet. · Do not take other medications without consulting your doctor. What to do at [facility] Resume your prehospital diet. If you have excessive nausea or vomitting call your doctor's office Home Physical Therapy is arranged. Use rolling walker when walking. Use Chevy Hose stockings until we see you in the office for your follow up appointment with Dr. Santosh Gonzales. Patients who have had a joint replacement should not drive until you are seen for your follow up appointment by Dr. Santosh Gonzales. When to Call - Call if you have a temperature greater then 101 
- Unable to keep food down - Loose control of your bladder or bowel function - Are unable to bear any weight  
- Need a pain medication refill DISCHARGE SUMMARY from Nurse The following personal items collected during your admission are returned to you:  
Dental Appliance: Dental Appliances: None Vision: Visual Aid: None Hearing Aid:   NA 
Jewelry: Jewelry: Ring (with Vik Levels) Clothing:   SELF Other Valuables: Other Valuables: None Valuables sent to safe:   NA 
 
PATIENT INSTRUCTIONS: 
 
After general anesthesia or intravenous sedation, for 24 hours or while taking prescription Narcotics: · Limit your activities · Do not drive and operate hazardous machinery · Do not make important personal or business decisions · Do  not drink alcoholic beverages · If you have not urinated within 8 hours after discharge, please contact your surgeon on call. Report the following to your surgeon: 
· Excessive pain, swelling, redness or odor of or around the surgical area · Temperature over 101 · Nausea and vomiting lasting longer than 4 hours or if unable to take medications · Any signs of decreased circulation or nerve impairment to extremity: change in color, persistent  numbness, tingling, coldness or increase pain · Follow hip precautions @ all times! · Any questions, call office @ [phone] Keep scheduled follow up appointment. If need to change, call office @ [phone]. *  Please give a list of your current medications to your Primary Care Provider. *  Please update this list whenever your medications are discontinued, doses are 
    changed, or new medications (including over-the-counter products) are added. *  Please carry medication information at all times in case of emergency situations. Hip Replacement Surgery: What to Expect at [facility] Your Recovery Hip replacement surgery replaces the worn parts of your hip joint. When you leave the hospital, you will probably be walking with crutches or a walker. You may be able to climb a few stairs and get in and out of bed and chairs. But you will need someone to help you at home for the next few weeks or until you have more energy and can move around better. If there is no one to help you at home, you may go to a rehabilitation center or long-term care center. You will go home with a bandage and stitches or staples. You can remove the bandage when your doctor tells you to. Your doctor will remove your stitches or staples 10 days to 3 weeks after your surgery. You may still have some mild pain, and the area may be swollen for 3 to 4 months after surgery. Your doctor will give you medicine for the pain. You will continue the rehabilitation program (rehab) you started in the hospital. The better you do with your rehab exercises, the sooner you will get your strength and movement back. Most people are able to return to work 4 weeks to 4 months after surgery. This care sheet gives you a general idea about how long it will take for you to recover. But each person recovers at a different pace. Follow the steps below to get better as quickly as possible. How can you care for yourself at home? Activity · Your doctor may not want your affected leg to cross the center of your body toward the other leg. If so, your therapist may suggest these ideas: ¨ Do not cross your legs. ¨ Be very careful as you get in or out of bed or a car, so your leg does not cross that imaginary line in the middle of your body. · Rest when you feel tired. You may take a nap, but do not stay in bed all day. · Work with your physical therapist to learn the best way to exercise. You may be able to take frequent, short walks using crutches or a walker. You will probably have to use crutches or a walker for at least 4 to 6 weeks. · Your doctor may advise you to stay away from activities that put stress on the joint. This includes sports such as tennis, football, and jogging. · Do not sit for longer than 30 to 45 minutes at a time. When you sit, use chairs with arms, and do not sit in low chairs. · Do not bend over more than 90 degrees (like the angle in a letter \"L\"). · Sleep on your back with your legs slightly apart or on your side with a pillow between your knees for about 6 weeks or as your doctor tells you. Do not sleep on your stomach or affected leg. · You may need to take sponge baths until your stitches or staples have been removed. You will probably be able to shower 24 hours after they are removed. · Ask your doctor when you can drive again. · Most people are able to return to work 4 weeks to 4 months after surgery. · Ask your doctor when it is okay for you to have sex. Diet · By the time you leave the hospital, you will probably be eating your normal diet. If your stomach is upset, try bland, low-fat foods like plain rice, broiled chicken, toast, and yogurt. Your doctor may recommend that you take iron and vitamin supplements. · Drink plenty of fluids (unless your doctor tells you not to). · Eat healthy foods, and watch your portion sizes. Try to stay at your ideal weight. Too much weight puts more stress on your new hip joint. · You may notice that your bowel movements are not regular right after your surgery. This is common. Try to avoid constipation and straining with bowel movements. You may want to take a fiber supplement every day. If you have not had a bowel movement after a couple of days, ask your doctor about taking a mild laxative. Medicines · Your doctor will tell you if and when you can restart your medicines. He or she will also give you instructions about taking any new medicines. · If you take blood thinners, such as warfarin (Coumadin), clopidogrel (Plavix), or aspirin, be sure to talk to your doctor. He or she will tell you if and when to start taking those medicines again. Make sure that you understand exactly what your doctor wants you to do. · Your doctor may give you a blood-thinning medicine to prevent blood clots. If you take a blood thinner, be sure you get instructions about how to take your medicine safely. Blood thinners can cause serious bleeding problems. This medicine could be in pill form or as a shot (injection). If a shot is necessary, your doctor will tell you how to do this. · Be safe with medicines. Take pain medicines exactly as directed. ¨ If the doctor gave you a prescription medicine for pain, take it as prescribed. ¨ If you are not taking a prescription pain medicine, ask your doctor if you can take an over-the-counter medicine. · If you think your pain medicine is making you sick to your stomach: 
¨ Take your medicine after meals (unless your doctor has told you not to). ¨ Ask your doctor for a different pain medicine. · If your doctor prescribed antibiotics, take them as directed. Do not stop taking them just because you feel better. You need to take the full course of antibiotics. Incision care · You will have a bandage over the cut (incision) and staples or stitches. Follow your doctor's instructions on when to take the bandage off. Giving the incision air will help it heal. 
· Your doctor will remove the staples or stitches 10 days to 3 weeks after the surgery and replace them with strips of tape. Leave the strips on for a week or until they fall off. Exercise · Your rehab program will include a number of exercises to do. Always do them as your therapist tells you. Ice and elevation · For pain, put ice or a cold pack on the area for 10 to 20 minutes at a time. Put a thin cloth between the ice and your skin. · Your ankle may swell for about 3 months. Prop up your ankle when you ice it or anytime you sit or lie down. Try to keep it above the level of your heart. This will help reduce swelling. Other instructions · Continue to wear your support stockings as your doctor says. These help to prevent blood clots. The length of time that you will have to wear them depends on your activity level and the amount of swelling you have. Most people wear these stockings for 4 to 6 weeks after surgery. · Wear medical alert jewelry that says you may need antibiotics before any procedure, including dental work. You can buy this at most [facility]. Preventing falls is also very important. To prevent falls: · Arrange furniture so that you will not trip on it. · Get rid of throw rugs, and move electrical cords out of the way. · Walk only in areas with plenty of light. · Put grab bars in showers and bathtubs. · Avoid icy or snowy sidewalks. · Wear shoes with sturdy, flat soles. Follow-up care is a key part of your treatment and safety. Be sure to make and go to all appointments, and call your doctor if you are having problems. It's also a good idea to know your test results and keep a list of the medicines you take. When should you call for help? Call 911 anytime you think you may need emergency care. For example, call if: 
· You passed out (lost consciousness). · You have severe trouble breathing. · You have sudden chest pain and shortness of breath, or you cough up blood. Call your doctor now or seek immediate medical care if: 
· You have signs that your hip may be dislocated, including: ¨ Severe pain and not being able to stand. ¨ A crooked leg that looks like your hip is out of position. ¨ Not being able to bend or straighten your leg. · Your leg or foot is cool or pale or changes color. · You cannot feel or move your leg. · You have signs of a blood clot, such as: 
¨ Pain in your calf, back of the knee, thigh, or groin. ¨ Redness and swelling in your leg or groin. · Your incision comes open and begins to bleed, or the bleeding increases. · You feel like your heart is racing or beating irregularly. · You have signs of infection, such as: 
¨ Increased pain, swelling, warmth, or redness. ¨ Red streaks leading from the incision. ¨ Pus draining from the incision. ¨ A fever. Watch closely for changes in your health, and be sure to contact your doctor if: 
· You do not have a bowel movement after taking a laxative. · You do not get better as expected. Where can you learn more? Go to http://gladys-deo.info/. Enter F476 in the search box to learn more about \"Hip Replacement Surgery: What to Expect at Home. \" Current as of: March 21, 2017 Content Version: 11.3 © 4635-4628 [employer].  Care instructions adapted under license by [address] (which disclaims liability or warranty for this information). If you have questions about a medical condition or this instruction, always ask your healthcare professional. [address] any warranty or liability for your use of this information. These are general instructions for a healthy lifestyle: No smoking/ No tobacco products/ Avoid exposure to second hand smoke Surgeon General's Warning:  Quitting smoking now greatly reduces serious risk to your health. Obesity, smoking, and sedentary lifestyle greatly increases your risk for illness A healthy diet, regular physical exercise & weight monitoring are important for maintaining a healthy lifestyle You may be retaining fluid if you have a history of heart failure or if you experience any of the following symptoms:  Weight gain of 3 pounds or more overnight or 5 pounds in a week, increased swelling in our hands or feet or shortness of breath while lying flat in bed. Please call your doctor as soon as you notice any of these symptoms; do not wait until your next office visit. Recognize signs and symptoms of STROKE: 
 
F-face looks uneven A-arms unable to move or move even S-speech slurred or non-existent T-time-call 911 as soon as signs and symptoms begin-DO NOT go Back to bed or wait to see if you get better-TIME IS BRAIN. The discharge information has been reviewed with the patient. The patient verbalized understanding. Information obtained by : 
I understand that if any problems occur once I am at home I am to contact my physician. I understand and acknowledge receipt of the instructions indicated above. Physician's or R.N.'s Signature                                                                  Date/Time Patient or Representative Signature                                                          Date/Time Discharge Orders None Introducing [facility]! Mary Banks introduces [employer] patient portal. Now you can access parts of your medical record, email your doctor's office, and request medication refills online. 1. In your internet browser, go to [email or web address] 2. Click on the First Time User? Click Here link in the Sign In box. You will see the New Member Sign Up page. 3. Enter your [employer] Access Code exactly as it appears below. You will not need to use this code after youve completed the sign-up process. If you do not sign up before the expiration date, you must request a new code. · [employer] Access Code: [email or web address] Expires: 10/10/2017  1:50 PM 
 
4. Enter the last four digits of your Social Security Number (xxxx) and Date of Birth (mm/dd/yyyy) as indicated and click Submit. You will be taken to the next sign-up page. 5. Create a [employer] ID. This will be your [employer] login ID and cannot be changed, so think of one that is secure and easy to remember. 6. Create a [employer] password. You can change your password at any time. 7. Enter your Password Reset Question and Answer. This can be used at a later time if you forget your password. 8. Enter your e-mail address. You will receive e-mail notification when new information is available in [address] 9. Click Sign Up. You can now view and download portions of your medical record. 10. Click the Download Summary menu link to download a portable copy of your medical information. If you have questions, please visit the Frequently Asked Questions section of the [employer] website. Remember, [employer] is NOT to be used for urgent needs. For medical emergencies, dial 911. Now available from your iPhone and Android! General Information Please provide this summary of care documentation to your next provider. Patient Signature:  ____________________________________________________________ Date:  ____________________________________________________________  
  
Fatemeh Faes Provider Signature:  ____________________________________________________________ Date:  ____________________________________________________________

## 2017-08-16 NOTE — PROGRESS NOTES
Patient arrived via bed into room. Patient alert and oriented. Patient unable to move lower extremities due to surgery. Pedal pulses present 2+. Compression stockings applied. Neurovascular status WNL. Admission assessment complete. Discuss diet and pain.

## 2017-08-16 NOTE — PROGRESS NOTES
C/o itching, medicated with benadryl. Resting in bed with wife at bedside. Good movement and sensation to feet. No other complaints.

## 2017-08-16 NOTE — ANESTHESIA POSTPROCEDURE EVALUATION
Post-Anesthesia Evaluation and Assessment    Patient: Jennifer Callahan MRN: [id number]  SSN: [id number]    YOB: 1945  Age: 66 y.o. Sex: male       Cardiovascular Function/Vital Signs  Visit Vitals    /59    Pulse 68    Temp 36.8 °C (98.3 °F)    Resp 16    Ht 5' 10\" (1.778 m)    Wt 88 kg (194 lb)    SpO2 100%    BMI 27.84 kg/m2       Patient is status post spinal anesthesia for Procedure(s):  HIP ARTHROPLASTY TOTAL/ RIGHT. Nausea/Vomiting: None    Postoperative hydration reviewed and adequate. Pain:  Pain Scale 1: Numeric (0 - 10) (08/16/17 1148)  Pain Intensity 1: 0 (08/16/17 1148)   Managed    Neurological Status:   Neuro (WDL): Exceptions to WDL (08/16/17 1118)  Neuro  Neurologic State: Drowsy (08/16/17 1118)  Orientation Level: Oriented to person (08/16/17 1118)  LLE Motor Response: Pharmacologically paralyzed (08/16/17 1118)  RLE Motor Response: Pharmacologically paralyzed (08/16/17 1118)   At baseline    Mental Status and Level of Consciousness: Arousable    Pulmonary Status:   O2 Device: Nasal cannula (08/16/17 1148)   Adequate oxygenation and airway patent    Complications related to anesthesia: None    Post-anesthesia assessment completed.  No concerns    Signed By: Lauren Forrester MD     August 16, 2017

## 2017-08-16 NOTE — PERIOP NOTES
TRANSFER - OUT REPORT:    Verbal report given to [facility] RN on John Watts  being transferred to joint [facility] room 308 for routine progression of care       Report consisted of patients Situation, Background, Assessment and   Recommendations(SBAR). Information from the following report(s) SBAR, Intake/Output and MAR was reviewed with the receiving nurse. Opportunity for questions and clarification was provided.       Patient transported with:   [employer]

## 2017-08-16 NOTE — ANESTHESIA PREPROCEDURE EVALUATION
Anesthetic History   No history of anesthetic complications            Review of Systems / Medical History  Patient summary reviewed, nursing notes reviewed and pertinent labs reviewed    Pulmonary  Within defined limits                 Neuro/Psych   Within defined limits           Cardiovascular    Hypertension: well controlled        Dysrhythmias (RBBB)       Exercise tolerance: >4 METS     GI/Hepatic/Renal  Within defined limits              Endo/Other        Arthritis     Other Findings              Physical Exam    Airway  Mallampati: II  TM Distance: 4 - 6 cm  Neck ROM: normal range of motion   Mouth opening: Normal     Cardiovascular    Rhythm: regular  Rate: normal         Dental  No notable dental hx       Pulmonary  Breath sounds clear to auscultation               Abdominal         Other Findings            Anesthetic Plan    ASA: 2  Anesthesia type: spinal            Anesthetic plan and risks discussed with: Patient

## 2017-08-17 ENCOUNTER — HOME HEALTH ADMISSION (OUTPATIENT)
Dept: HOME HEALTH SERVICES | Facility: HOME HEALTH | Age: 72
End: 2017-08-17
Payer: MEDICARE

## 2017-08-17 VITALS
OXYGEN SATURATION: 98 % | DIASTOLIC BLOOD PRESSURE: 70 MMHG | RESPIRATION RATE: 18 BRPM | HEIGHT: 70 IN | HEART RATE: 61 BPM | SYSTOLIC BLOOD PRESSURE: 142 MMHG | TEMPERATURE: 97.7 F | BODY MASS INDEX: 27.77 KG/M2 | WEIGHT: 194 LBS

## 2017-08-17 LAB
ANION GAP SERPL CALC-SCNC: 7 MMOL/L (ref 7–16)
BUN SERPL-MCNC: 28 MG/DL (ref 8–23)
CALCIUM SERPL-MCNC: 8.3 MG/DL (ref 8.3–10.4)
CHLORIDE SERPL-SCNC: 105 MMOL/L (ref 98–107)
CO2 SERPL-SCNC: 24 MMOL/L (ref 21–32)
CREAT SERPL-MCNC: 1.46 MG/DL (ref 0.8–1.5)
GLUCOSE SERPL-MCNC: 172 MG/DL (ref 65–100)
HGB BLD-MCNC: 11.3 G/DL (ref 13.6–17.2)
MM INDURATION POC: 0 MM (ref 0–5)
POTASSIUM SERPL-SCNC: 4.5 MMOL/L (ref 3.5–5.1)
PPD POC: NORMAL NEGATIVE
SODIUM SERPL-SCNC: 136 MMOL/L (ref 136–145)

## 2017-08-17 PROCEDURE — 97116 GAIT TRAINING THERAPY: CPT

## 2017-08-17 PROCEDURE — 97535 SELF CARE MNGMENT TRAINING: CPT

## 2017-08-17 PROCEDURE — 74011250637 HC RX REV CODE- 250/637: Performed by: ORTHOPAEDIC SURGERY

## 2017-08-17 PROCEDURE — 80048 BASIC METABOLIC PNL TOTAL CA: CPT | Performed by: ORTHOPAEDIC SURGERY

## 2017-08-17 PROCEDURE — 97150 GROUP THERAPEUTIC PROCEDURES: CPT

## 2017-08-17 PROCEDURE — 97110 THERAPEUTIC EXERCISES: CPT

## 2017-08-17 PROCEDURE — 85018 HEMOGLOBIN: CPT | Performed by: ORTHOPAEDIC SURGERY

## 2017-08-17 PROCEDURE — 36415 COLL VENOUS BLD VENIPUNCTURE: CPT | Performed by: ORTHOPAEDIC SURGERY

## 2017-08-17 PROCEDURE — 74011250636 HC RX REV CODE- 250/636: Performed by: ORTHOPAEDIC SURGERY

## 2017-08-17 RX ADMIN — CELECOXIB 200 MG: 200 CAPSULE ORAL at 09:09

## 2017-08-17 RX ADMIN — SENNOSIDES AND DOCUSATE SODIUM 2 TABLET: 8.6; 5 TABLET ORAL at 09:09

## 2017-08-17 RX ADMIN — ACETAMINOPHEN 1000 MG: 500 TABLET, FILM COATED ORAL at 12:03

## 2017-08-17 RX ADMIN — CEFAZOLIN 2 G: 1 INJECTION, POWDER, FOR SOLUTION INTRAMUSCULAR; INTRAVENOUS; PARENTERAL at 03:45

## 2017-08-17 RX ADMIN — LOSARTAN POTASSIUM 100 MG: 50 TABLET ORAL at 09:09

## 2017-08-17 RX ADMIN — ACETAMINOPHEN 1000 MG: 500 TABLET, FILM COATED ORAL at 03:45

## 2017-08-17 RX ADMIN — ASPIRIN 325 MG: 325 TABLET, DELAYED RELEASE ORAL at 09:09

## 2017-08-17 RX ADMIN — TRANEXAMIC ACID 1300 MG: 650 TABLET, FILM COATED ORAL at 09:09

## 2017-08-17 RX ADMIN — HYDROMORPHONE HYDROCHLORIDE 2 MG: 2 TABLET ORAL at 03:45

## 2017-08-17 RX ADMIN — GABAPENTIN 100 MG: 100 CAPSULE ORAL at 09:09

## 2017-08-17 RX ADMIN — MULTIPLE VITAMINS W/ MINERALS TAB 1 TABLET: TAB at 09:09

## 2017-08-17 NOTE — PROGRESS NOTES
Up in recliner. No c/o pain. R hip incision with prineo dry and intact. TEDs on. Good movement and sensation to LEs.

## 2017-08-17 NOTE — DISCHARGE INSTRUCTIONS
[address] [facility]   Patient Discharge Instructions    Chriss Coker / [id number] : 1945    Admitted 2017 Discharged: 2017     IF YOU HAVE ANY PROBLEMS ONCE YOU ARE AT HOME CALL THE FOLLOWING NUMBERS:   Main office number: [phone]    Take Home Medications     · It is important that you take the medication exactly as they are prescribed. · Keep your medication in the bottles provided by the pharmacist and keep a list of the medication names, dosages, and times to be taken in your wallet. · Do not take other medications without consulting your doctor. What to do at [address] your prehospital diet. If you have excessive nausea or vomitting call your doctor's office     Home Physical Therapy is arranged. Use rolling walker when walking. Use [employer] stockings until we see you in the office for your follow up appointment with Dr. Ivette Fung. Patients who have had a joint replacement should not drive until you are seen for your follow up appointment by Dr. Ivette Fung. When to Call    - Call if you have a temperature greater then 101  - Unable to keep food down  - Loose control of your bladder or bowel function  - Are unable to bear any weight   - Need a pain medication refill       DISCHARGE SUMMARY from Nurse    The following personal items collected during your admission are returned to you:   Dental Appliance: Dental Appliances: None  Vision: Visual Aid: None  Hearing Aid:   NA  Jewelry: Jewelry: Ring (with [employer])  Clothing:   SELF  Other Valuables:  Other Valuables: None  Valuables sent to safe:   NA    PATIENT INSTRUCTIONS:    After general anesthesia or intravenous sedation, for 24 hours or while taking prescription Narcotics:  · Limit your activities  · Do not drive and operate hazardous machinery  · Do not make important personal or business decisions  · Do  not drink alcoholic beverages  · If you have not urinated within 8 hours after discharge, please contact your surgeon on call. Report the following to your surgeon:  · Excessive pain, swelling, redness or odor of or around the surgical area  · Temperature over 101  · Nausea and vomiting lasting longer than 4 hours or if unable to take medications  · Any signs of decreased circulation or nerve impairment to extremity: change in color, persistent  numbness, tingling, coldness or increase pain  · Follow hip precautions @ all times! · Any questions, call office @ [phone]      Keep scheduled follow up appointment. If need to change, call office @ [phone]. *  Please give a list of your current medications to your Primary Care Provider. *  Please update this list whenever your medications are discontinued, doses are      changed, or new medications (including over-the-counter products) are added. *  Please carry medication information at all times in case of emergency situations. Hip Replacement Surgery: What to Expect at Home  Your Recovery  Hip replacement surgery replaces the worn parts of your hip joint. When you leave the hospital, you will probably be walking with crutches or a walker. You may be able to climb a few stairs and get in and out of bed and chairs. But you will need someone to help you at home for the next few weeks or until you have more energy and can move around better. If there is no one to help you at home, you may go to a rehabilitation center or long-term care center. You will go home with a bandage and stitches or staples. You can remove the bandage when your doctor tells you to. Your doctor will remove your stitches or staples 10 days to 3 weeks after your surgery. You may still have some mild pain, and the area may be swollen for 3 to 4 months after surgery. Your doctor will give you medicine for the pain.   You will continue the rehabilitation program (rehab) you started in the hospital. The better you do with your rehab exercises, the sooner you will get your strength and movement back. Most people are able to return to work 4 weeks to 4 months after surgery. This care sheet gives you a general idea about how long it will take for you to recover. But each person recovers at a different pace. Follow the steps below to get better as quickly as possible. How can you care for yourself at home? Activity  · Your doctor may not want your affected leg to cross the center of your body toward the other leg. If so, your therapist may suggest these ideas:  ¨ Do not cross your legs. ¨ Be very careful as you get in or out of bed or a car, so your leg does not cross that imaginary line in the middle of your body. · Rest when you feel tired. You may take a nap, but do not stay in bed all day. · Work with your physical therapist to learn the best way to exercise. You may be able to take frequent, short walks using crutches or a walker. You will probably have to use crutches or a walker for at least 4 to 6 weeks. · Your doctor may advise you to stay away from activities that put stress on the joint. This includes sports such as tennis, football, and jogging. · Do not sit for longer than 30 to 45 minutes at a time. When you sit, use chairs with arms, and do not sit in low chairs. · Do not bend over more than 90 degrees (like the angle in a letter \"L\"). · Sleep on your back with your legs slightly apart or on your side with a pillow between your knees for about 6 weeks or as your doctor tells you. Do not sleep on your stomach or affected leg. · You may need to take sponge baths until your stitches or staples have been removed. You will probably be able to shower 24 hours after they are removed. · Ask your doctor when you can drive again. · Most people are able to return to work 4 weeks to 4 months after surgery. · Ask your doctor when it is okay for you to have sex. Diet  · By the time you leave the hospital, you will probably be eating your normal diet.  If your stomach is upset, try bland, low-fat foods like plain rice, broiled chicken, toast, and yogurt. Your doctor may recommend that you take iron and vitamin supplements. · Drink plenty of fluids (unless your doctor tells you not to). · Eat healthy foods, and watch your portion sizes. Try to stay at your ideal weight. Too much weight puts more stress on your new hip joint. · You may notice that your bowel movements are not regular right after your surgery. This is common. Try to avoid constipation and straining with bowel movements. You may want to take a fiber supplement every day. If you have not had a bowel movement after a couple of days, ask your doctor about taking a mild laxative. Medicines  · Your doctor will tell you if and when you can restart your medicines. He or she will also give you instructions about taking any new medicines. · If you take blood thinners, such as warfarin (Coumadin), clopidogrel (Plavix), or aspirin, be sure to talk to your doctor. He or she will tell you if and when to start taking those medicines again. Make sure that you understand exactly what your doctor wants you to do. · Your doctor may give you a blood-thinning medicine to prevent blood clots. If you take a blood thinner, be sure you get instructions about how to take your medicine safely. Blood thinners can cause serious bleeding problems. This medicine could be in pill form or as a shot (injection). If a shot is necessary, your doctor will tell you how to do this. · Be safe with medicines. Take pain medicines exactly as directed. ¨ If the doctor gave you a prescription medicine for pain, take it as prescribed. ¨ If you are not taking a prescription pain medicine, ask your doctor if you can take an over-the-counter medicine. · If you think your pain medicine is making you sick to your stomach:  ¨ Take your medicine after meals (unless your doctor has told you not to). ¨ Ask your doctor for a different pain medicine.   · If your doctor prescribed antibiotics, take them as directed. Do not stop taking them just because you feel better. You need to take the full course of antibiotics. Incision care  · You will have a bandage over the cut (incision) and staples or stitches. Follow your doctor's instructions on when to take the bandage off. Giving the incision air will help it heal.  · Your doctor will remove the staples or stitches 10 days to 3 weeks after the surgery and replace them with strips of tape. Leave the strips on for a week or until they fall off. Exercise  · Your rehab program will include a number of exercises to do. Always do them as your therapist tells you. Ice and elevation  · For pain, put ice or a cold pack on the area for 10 to 20 minutes at a time. Put a thin cloth between the ice and your skin. · Your ankle may swell for about 3 months. Prop up your ankle when you ice it or anytime you sit or lie down. Try to keep it above the level of your heart. This will help reduce swelling. Other instructions  · Continue to wear your support stockings as your doctor says. These help to prevent blood clots. The length of time that you will have to wear them depends on your activity level and the amount of swelling you have. Most people wear these stockings for 4 to 6 weeks after surgery. · Wear medical alert jewelry that says you may need antibiotics before any procedure, including dental work. You can buy this at most drugstores. Preventing falls is also very important. To prevent falls:  · Arrange furniture so that you will not trip on it. · Get rid of throw rugs, and move electrical cords out of the way. · Walk only in areas with plenty of light. · Put grab bars in showers and bathtubs. · Avoid icy or snowy sidewalks. · Wear shoes with sturdy, flat soles. Follow-up care is a key part of your treatment and safety. Be sure to make and go to all appointments, and call your doctor if you are having problems.  It's also a good idea to know your test results and keep a list of the medicines you take. When should you call for help? Call 911 anytime you think you may need emergency care. For example, call if:  · You passed out (lost consciousness). · You have severe trouble breathing. · You have sudden chest pain and shortness of breath, or you cough up blood. Call your doctor now or seek immediate medical care if:  · You have signs that your hip may be dislocated, including:  ¨ Severe pain and not being able to stand. ¨ A crooked leg that looks like your hip is out of position. ¨ Not being able to bend or straighten your leg. · Your leg or foot is cool or pale or changes color. · You cannot feel or move your leg. · You have signs of a blood clot, such as:  ¨ Pain in your calf, back of the knee, thigh, or groin. ¨ Redness and swelling in your leg or groin. · Your incision comes open and begins to bleed, or the bleeding increases. · You feel like your heart is racing or beating irregularly. · You have signs of infection, such as:  ¨ Increased pain, swelling, warmth, or redness. ¨ Red streaks leading from the incision. ¨ Pus draining from the incision. ¨ A fever. Watch closely for changes in your health, and be sure to contact your doctor if:  · You do not have a bowel movement after taking a laxative. · You do not get better as expected. Where can you learn more? Go to http://gladys-deo.info/. Enter T380 in the search box to learn more about \"Hip Replacement Surgery: What to Expect at Home. \"  Current as of: March 21, 2017  Content Version: 11.3  © 6429-8001 [employer]. Care instructions adapted under license by [employer] (which disclaims liability or warranty for this information).  If you have questions about a medical condition or this instruction, always ask your healthcare professional. [address] any warranty or liability for your use of this information. These are general instructions for a healthy lifestyle:    No smoking/ No tobacco products/ Avoid exposure to second hand smoke    Surgeon General's Warning:  Quitting smoking now greatly reduces serious risk to your health. Obesity, smoking, and sedentary lifestyle greatly increases your risk for illness    A healthy diet, regular physical exercise & weight monitoring are important for maintaining a healthy lifestyle    You may be retaining fluid if you have a history of heart failure or if you experience any of the following symptoms:  Weight gain of 3 pounds or more overnight or 5 pounds in a week, increased swelling in our hands or feet or shortness of breath while lying flat in bed. Please call your doctor as soon as you notice any of these symptoms; do not wait until your next office visit. Recognize signs and symptoms of STROKE:    F-face looks uneven    A-arms unable to move or move even    S-speech slurred or non-existent    T-time-call 911 as soon as signs and symptoms begin-DO NOT go       Back to bed or wait to see if you get better-TIME IS BRAIN. The discharge information has been reviewed with the patient. The patient verbalized understanding. Information obtained by :  I understand that if any problems occur once I am at home I am to contact my physician. I understand and acknowledge receipt of the instructions indicated above.                                                                                                                                            Physician's or R.N.'s Signature                                                                  Date/Time                                                                                                                                              Patient or Representative Signature                                                          Date/Time

## 2017-08-17 NOTE — PROGRESS NOTES
[address] [facility]  Face to Face Encounter    Patients Name: Nora Flores    YOB: 1945    Ordering Physician: Deneen Rangel    Primary Diagnosis: Localized osteoarthrosis of right hip [M16.11]    Date of Face to Face:   8/17/2017                                  Face to Face Encounter findings are related to primary reason for home care:   yes. 1. I certify that the patient needs intermittent care as follows: skilled nursing care:  skilled observation/assessment, patient education  physical therapy: stretching/ROM    2. I certify that this patient is homebound, that is: 1) patient requires the use of a walker device, special transportation, or assistance of another to leave the home; or 2) patient's condition makes leaving the home medically contraindicated; and 3) patient has a normal inability to leave the home and leaving the home requires considerable and taxing effort. Patient may leave the home for infrequent and short duration for medical reasons, and occasional absences for non-medical reasons. Homebound status is due to the following functional limitations: Patient currently under activity restrictions secondary to recent surgical procedure, this hinders their ability to safely leave the home. 3. I certify that this patient is under my care and that I, or a nurse practitioner or  [id number], or clinical nurse specialist, or certified nurse midwife, working with me, had a Face-to-Face Encounter that meets the physician Face-to-Face Encounter requirements.   The following are the clinical findings from the [address] [place] [address] encounter that support the need for skilled services and is a summary of the encounter: progress notes     See attached progess note      REESE Sapp  8/17/2017      THE FOLLOWING TO BE COMPLETED BY THE COMMUNITY PHYSICIAN:    I concur with the findings described above from the [facility] encounter that this patient is homebound and in need of a skilled service.     Certifying Physician: _____________________________________      Printed Certifying Physician Name: _____________________________________    Date: _________________

## 2017-08-17 NOTE — PROGRESS NOTES
Problem: Mobility Impaired (Adult and Pediatric)  Goal: *Acute Goals and Plan of Care (Insert Text)  GOALS (1-4 days):  (1.)Mr. Glez will move from supine to sit and sit to supine in bed with SUPERVISION. (2.)Mr. Glez will transfer from bed to chair and chair to bed with STAND BY ASSIST using the least restrictive device. Goal met  (3.)Mr. Glez will ambulate with STAND BY ASSIST for 200 feet with the least restrictive device. Goal met  (4.)Mr. Glez will ambulate up/down 2 steps with No railing with MINIMAL ASSIST with no device. (5.)Mr. Glez will state/observe GIULIANA precautions with No verbal cues. goal met  ________________________________________________________________________________________________      PHYSICAL THERAPY JOINT CAMP GIULIANA: Daily Note and PM 8/17/2017  INPATIENT: Hospital Day: 2  [place] / Product Type: Medicare /      NAME/AGE/GENDER: Trevor Bella is a 66 y.o. male        PRIMARY DIAGNOSIS:  Localized osteoarthrosis of right hip [M16.11]              Procedure(s) and Anesthesia Type:     * HIP ARTHROPLASTY TOTAL/ RIGHT - Spinal (Right)  ICD-10: Treatment Diagnosis:        · Pain in Right Hip (M25.551)  · Stiffness of Right Hip, Not elsewhere classified (M25.651)  · Difficulty in walking, Not elsewhere classified (R26.2)       ASSESSMENT:      Mr. Briseida Quintero presents with impaired strength & mobility s/p right GIULIANA. Pt also had decreased stability during out of bed activity. Pt will benefit from follow up therapy to help restore safe function prior to returning home with caregiver. Pt. Continues to do well with no complaints. He increased gait distance with walker and did giuliana exercises with cues only. Reviewed giuliana precautions. No questions or concerns about going home today from patient or wife. This section established at most recent assessment   PROBLEM LIST (Impairments causing functional limitations):  1.  Decreased Strength  2. Decreased ADL/Functional Activities  3. Decreased Transfer Abilities  4. Decreased Ambulation Ability/Technique  5. Decreased Balance  6. Increased Pain  7. Decreased Activity Tolerance  8. Decreased Knowledge of Precautions  9. Decreased [place] with Home Exercise Program    INTERVENTIONS PLANNED: (Benefits and precautions of physical therapy have been discussed with the patient.)  1. Bed Mobility  2. Gait Training  3. Home Exercise Program (HEP)  4. Therapeutic Exercise/Strengthening  5. Transfer Training  6. Range of Motion: active/assisted/passive  7. Therapeutic Activities  8. Group Therapy      TREATMENT PLAN: Frequency/Duration: Follow patient BID   to address above goals. Rehabilitation Potential For Stated Goals: GOOD      RECOMMENDED REHABILITATION/EQUIPMENT: (at time of discharge pending progress): Continue Skilled Therapy and Home Health: Physical Therapy. HISTORY:   History of Present Injury/Illness (Reason for Referral): The patient has end stage arthritis of the right hip. The patient was evaluated and examined in the office prior to today and was found to have a history on physical exam consistent with intra-articular pathology of the right hip. Patient complains of anterior groin pain predominately. Pain occurs during activity. Patient has difficulty putting on socks/shoes and has notable pain when getting up from a chair and getting out of a car. Patient does not complain of significant lateral hip pain or radicular pain down the leg. There have been no changes to the patient's orthopedic condition since the last office visit     Past Medical History/Comorbidities:   Mr. Paul Koroma  has a past medical history of Adverse effect of anesthesia; Environmental and seasonal allergies; HTN (hypertension); Insomnia; Osteoarthritis; and RBBB.   Mr. Paul Koroma  has a past surgical history that includes tonsillectomy (1950's); ankle fracture tx (Left, 07/2012); rotator cuff repair (Right, 11/2012); and knee arthroscopy (Left, 2016). Social History/Living Environment:   Home Environment: Private residence  # Steps to Enter: 2  Rails to Enter: No  One/Two Story Residence: One story  Living Alone: No  Support Systems: Spouse/Significant Other/Partner  Patient Expects to be Discharged to[de-identified] Private residence  Current DME Used/Available at Home: Commode, bedside, Shower chair, Walker, rolling  Tub or Shower Type: Shower  Prior Level of Function/Work/Activity:  Pt was independent without an assistive device prior to this admission   Number of Personal Factors/Comorbidities that affect the Plan of Care: 1-2: MODERATE COMPLEXITY   EXAMINATION:   Most Recent Physical Functioning:                RLE AROM  R Hip Flexion: 90  R Hip ABduction: 10  R Knee Extension: 0              Bed Mobility  Supine to Sit: Contact guard assistance  Sit to Supine: Contact guard assistance     Transfers  Sit to Stand: Supervision  Stand to Sit: Supervision  Bed to Chair: Supervision     Balance  Sitting: Intact  Standing: With support                Weight Bearing Status  Right Side Weight Bearing: As tolerated  Distance (ft): 380 Feet (ft) (x 2)  Ambulation - Level of Assistance: Supervision  Assistive Device: Walker, rolling  Speed/Gretta: Delayed  Step Length: Left shortened;Right shortened  Stance: Right decreased  Gait Abnormalities: Antalgic  Number of Stairs Trained: 3  Stairs - Level of Assistance: Stand-by asssistance  Rail Use: Both  Interventions: Safety awareness training;Verbal cues      Braces/Orthotics: none     Right Hip Cold  Type: Cold/ice packs       Body Structures Involved:  1. Joints  2. Muscles Body Functions Affected:  1. Sensory/Pain  2. Movement Related Activities and Participation Affected:  1. General Tasks and Demands  2.  Mobility   Number of elements that affect the Plan of Care: 4+: HIGH COMPLEXITY   CLINICAL PRESENTATION:   Presentation: Stable and uncomplicated: LOW COMPLEXITY CLINICAL DECISION MAKIN[address] [place] [id number] AM-PAC 6 Clicks   Basic Mobility Inpatient Short Form  How much difficulty does the patient currently have. .. Unable A Lot A Little None   1. Turning over in bed (including adjusting bedclothes, sheets and blankets)? [ ] 1   [ ] 2   [X] 3   [ ] 4   2. Sitting down on and standing up from a chair with arms ( e.g., wheelchair, bedside commode, etc.)   [ ] 1   [ ] 2   [X] 3   [ ] 4   3. Moving from lying on back to sitting on the side of the bed? [ ] 1   [ ] 2   [X] 3   [ ] 4   How much help from another person does the patient currently need. .. Total A Lot A Little None   4. Moving to and from a bed to a chair (including a wheelchair)? [ ] 1   [ ] 2   [X] 3   [ ] 4   5. Need to walk in hospital room? [ ] 1   [ ] 2   [X] 3   [ ] 4   6. Climbing 3-5 steps with a railing? [X] 1   [ ] 2   [ ] 3   [ ] 4   © 2007, Trustees of [address] [place] [address] [address] [address], under license to [employer]. All rights reserved       Score:  Initial: 16 Most Recent: X (Date: -- )     Interpretation of Tool:  Represents activities that are increasingly more difficult (i.e. Bed mobility, Transfers, Gait). Score 24 23 22-20 19-15 14-10 9-7 6       Modifier CH CI CJ CK CL CM CN         · Mobility - Walking and Moving Around:               - CURRENT STATUS:    CK - 40%-59% impaired, limited or restricted               - GOAL STATUS:           CJ - 20%-39% impaired, limited or restricted               - D/C STATUS:                       ---------------To be determined---------------  [place] / Product Type: Medicare /       Medical Necessity:     · Patient is expected to demonstrate progress in strength, balance, coordination and functional technique to decrease assistance required with bed mobility, transfers & gait.   Reason for Services/Other Comments:  · Patient continues to require skilled intervention due to pt not independent with functional mobility. Use of outcome tool(s) and clinical judgement create a POC that gives a: Clear prediction of patient's progress: LOW COMPLEXITY                 TREATMENT:   (In addition to Assessment/Re-Assessment sessions the following treatments were rendered)      Pre-treatment Symptoms/Complaints:  none  Pain: Initial:0     Post Session:  No compalints      Gait Training (15 Minutes):  Gait training to improve and/or restore physical functioning as related to mobility, strength and balance. Ambulated 380 Feet (ft) (x 2) with Supervision using a Walker, rolling and minimal Safety awareness training;Verbal cues related to their stance phase to promote proper body alignment, promote proper body posture and promote proper body mechanics. Therapeutic Exercise: (45 Minutes):  Exercises per grid below to improve mobility and strength. Required minimal visual, verbal and manual cues to promote proper body alignment, promote proper body posture and promote proper body mechanics. Progressed range and repetitions as indicated. Date:  8/17  Date:    Date:      ACTIVITY/EXERCISE AM PM AM PM AM PM   GROUP THERAPY  [ ]  [x ]  [ ]  [ ]  [ ]  [ ]   Ankle Pumps 15 a 15 a           Quad Sets  10a  15a           Gluteal Sets  10a  15a           Hip ABd/ADduction  10a  15a           Straight Leg Raises               Knee Slides  10a  15a           Short Arc Quads    15a           Long Arc Quads    15a           Chair Slides                               B = bilateral; AA = active assistive; A = active; P = passive       Treatment/Session Assessment:         Response to Treatment:  Pt. Progressing well.      Education:  [x ] Home Exercises  [X] Fall Precautions  [X] Hip Precautions [ x] D/C Instruction Review  [ x] Knee/Hip Prosthesis Review  [X] Walker Management/Safety [ ] Adaptive Equipment as Needed         Interdisciplinary Collaboration:   · Physical Therapist, Physical Therapy Assistant and Rehabilitation Attendant     After treatment position/precautions:   · Up in chair, Bed/Chair-wheels locked, Bed in low position and Call light within reach     Compliance with Program/Exercises: no  Questions. Recommendations/Intent for next treatment session:  Treatment next visit will focus on increasing Mr. Glez's independence with bed mobility, transfers, gait training, strength/ROM exercises, modalities for pain, and patient education.        Total Treatment Duration:  PT Patient Time In/Time Out  Time In: 1300  Time Out: 250 [facility],

## 2017-08-17 NOTE — PROGRESS NOTES
Problem: Mobility Impaired (Adult and Pediatric)  Goal: *Acute Goals and Plan of Care (Insert Text)  GOALS (1-4 days):  (1.)Mr. Glez will move from supine to sit and sit to supine in bed with SUPERVISION. (2.)Mr. Glez will transfer from bed to chair and chair to bed with STAND BY ASSIST using the least restrictive device. (3.)Mr. Glez will ambulate with STAND BY ASSIST for 200 feet with the least restrictive device. (4.)Mr. Glez will ambulate up/down 2 steps with No railing with MINIMAL ASSIST with no device. (5.)Mr. Glez will state/observe SHANTANU precautions with No verbal cues. ________________________________________________________________________________________________      PHYSICAL THERAPY JOINT CAMP SHANTANU: Daily Note and AM 8/17/2017  INPATIENT: Hospital Day: 2  [place] / Product Type: Medicare /      NAME/AGE/GENDER: Malinda Hermosillo is a 66 y.o. male        PRIMARY DIAGNOSIS:  Localized osteoarthrosis of right hip [M16.11]              Procedure(s) and Anesthesia Type:     * HIP ARTHROPLASTY TOTAL/ RIGHT - Spinal (Right)  ICD-10: Treatment Diagnosis:        · Pain in Right Hip (M25.551)  · Stiffness of Right Hip, Not elsewhere classified (M25.651)  · Difficulty in walking, Not elsewhere classified (R26.2)       ASSESSMENT:      Mr. Angela Kilgore presents with impaired strength & mobility s/p right SHANTANU. Pt also had decreased stability during out of bed activity. Pt will benefit from follow up therapy to help restore safe function prior to returning home with caregiver. Pt. Doing well this am with no complaints and no pain. He ambulated in the room with walker and did shantanu exercises with cues and assistance. Reviewed shantanu precautions. No questions. This section established at most recent assessment   PROBLEM LIST (Impairments causing functional limitations):  1. Decreased Strength  2. Decreased ADL/Functional Activities  3.  Decreased Transfer Abilities  4. Decreased Ambulation Ability/Technique  5. Decreased Balance  6. Increased Pain  7. Decreased Activity Tolerance  8. Decreased Knowledge of Precautions  9. Decreased [place] with Home Exercise Program    INTERVENTIONS PLANNED: (Benefits and precautions of physical therapy have been discussed with the patient.)  1. Bed Mobility  2. Gait Training  3. Home Exercise Program (HEP)  4. Therapeutic Exercise/Strengthening  5. Transfer Training  6. Range of Motion: active/assisted/passive  7. Therapeutic Activities  8. Group Therapy      TREATMENT PLAN: Frequency/Duration: Follow patient BID   to address above goals. Rehabilitation Potential For Stated Goals: GOOD      RECOMMENDED REHABILITATION/EQUIPMENT: (at time of discharge pending progress): Continue Skilled Therapy and Home Health: Physical Therapy. HISTORY:   History of Present Injury/Illness (Reason for Referral): The patient has end stage arthritis of the right hip. The patient was evaluated and examined in the office prior to today and was found to have a history on physical exam consistent with intra-articular pathology of the right hip. Patient complains of anterior groin pain predominately. Pain occurs during activity. Patient has difficulty putting on socks/shoes and has notable pain when getting up from a chair and getting out of a car. Patient does not complain of significant lateral hip pain or radicular pain down the leg. There have been no changes to the patient's orthopedic condition since the last office visit     Past Medical History/Comorbidities:   Mr. Jazmyn Jules  has a past medical history of Adverse effect of anesthesia; Environmental and seasonal allergies; HTN (hypertension); Insomnia; Osteoarthritis; and RBBB. Mr. Jazmyn Jules  has a past surgical history that includes tonsillectomy (1950's); ankle fracture tx (Left, 07/2012); rotator cuff repair (Right, 11/2012); and knee arthroscopy (Left, 2016).   Social History/Living Environment:   Home Environment: Private residence  # Steps to Enter: 2  Rails to [employer]: No  One/Two Story Residence: One story  Living Alone: No  Support Systems: Spouse/Significant Other/Partner  Patient Expects to be Discharged to[de-identified] Private residence  Current DME Used/Available at Home: Commode, bedside, Shower chair, Walker, rolling  Tub or Shower Type: Shower  Prior Level of Function/Work/Activity:  Pt was independent without an assistive device prior to this admission   Number of Personal Factors/Comorbidities that affect the Plan of Care: 1-2: MODERATE COMPLEXITY   EXAMINATION:   Most Recent Physical Functioning:                RLE AROM  R Hip Flexion: 90  R Hip ABduction: 10  R Knee Extension: 0              Bed Mobility  Supine to Sit: Contact guard assistance  Sit to Supine: Contact guard assistance     Transfers  Sit to Stand: Contact guard assistance  Stand to Sit: Contact guard assistance  Bed to Chair: Contact guard assistance     Balance  Sitting: Intact  Standing: With support                Weight Bearing Status  Right Side Weight Bearing: As tolerated  Distance (ft): 25 Feet (ft)  Ambulation - Level of Assistance: Contact guard assistance  Assistive Device: Walker, rolling  Speed/Gretta: Delayed  Step Length: Left shortened;Right shortened  Stance: Right decreased  Gait Abnormalities: Antalgic  Interventions: Safety awareness training;Verbal cues      Braces/Orthotics: none             Body Structures Involved:  1. Joints  2. Muscles Body Functions Affected:  1. Sensory/Pain  2. Movement Related Activities and Participation Affected:  1. General Tasks and Demands  2.  Mobility   Number of elements that affect the Plan of Care: 4+: HIGH COMPLEXITY   CLINICAL PRESENTATION:   Presentation: Stable and uncomplicated: LOW COMPLEXITY   CLINICAL DECISION MAKIN [facility] [address] [id number] AM-PAC 6 Clicks   Basic Mobility Inpatient Short Form  How much difficulty does the patient currently have... Unable A Lot A Little None   1. Turning over in bed (including adjusting bedclothes, sheets and blankets)? [ ] 1   [ ] 2   [X] 3   [ ] 4   2. Sitting down on and standing up from a chair with arms ( e.g., wheelchair, bedside commode, etc.)   [ ] 1   [ ] 2   [X] 3   [ ] 4   3. Moving from lying on back to sitting on the side of the bed? [ ] 1   [ ] 2   [X] 3   [ ] 4   How much help from another person does the patient currently need. .. Total A Lot A Little None   4. Moving to and from a bed to a chair (including a wheelchair)? [ ] 1   [ ] 2   [X] 3   [ ] 4   5. Need to walk in hospital room? [ ] 1   [ ] 2   [X] 3   [ ] 4   6. Climbing 3-5 steps with a railing? [X] 1   [ ] 2   [ ] 3   [ ] 4   © 2007, Trustees of [address] [place] [address] [address] [address], under license to [employer]. All rights reserved       Score:  Initial: 16 Most Recent: X (Date: -- )     Interpretation of Tool:  Represents activities that are increasingly more difficult (i.e. Bed mobility, Transfers, Gait). Score 24 23 22-20 19-15 14-10 9-7 6       Modifier CH CI CJ CK CL CM CN         · Mobility - Walking and Moving Around:               - CURRENT STATUS:    CK - 40%-59% impaired, limited or restricted               - GOAL STATUS:           CJ - 20%-39% impaired, limited or restricted               - D/C STATUS:                       ---------------To be determined---------------  [place] / Product Type: Medicare /       Medical Necessity:     · Patient is expected to demonstrate progress in strength, balance, coordination and functional technique to decrease assistance required with bed mobility, transfers & gait. Reason for Services/Other Comments:  · Patient continues to require skilled intervention due to pt not independent with functional mobility.    Use of outcome tool(s) and clinical judgement create a POC that gives a: Clear prediction of patient's progress: LOW COMPLEXITY                 TREATMENT:   (In addition to Assessment/Re-Assessment sessions the following treatments were rendered)      Pre-treatment Symptoms/Complaints:  none  Pain: Initial:0     Post Session:  0/10      Gait Training (15 Minutes):  Gait training to improve and/or restore physical functioning as related to mobility, strength and balance. Ambulated 25 Feet (ft) with Contact guard assistance using a Walker, rolling and minimal Safety awareness training;Verbal cues related to their stance phase to promote proper body alignment, promote proper body posture and promote proper body mechanics. Therapeutic Exercise: (15 Minutes):  Exercises per grid below to improve mobility and strength. Required minimal visual, verbal and manual cues to promote proper body alignment, promote proper body posture and promote proper body mechanics. Progressed range and repetitions as indicated. Date:  8/17  Date:    Date:      ACTIVITY/EXERCISE AM PM AM PM AM PM   GROUP THERAPY  [ ]  [ ]  [ ]  [ ]  [ ]  [ ]   Ankle Pumps 15 a             Quad Sets  10a             Gluteal Sets  10a             Hip ABd/ADduction  10a             Straight Leg Raises               Knee Slides  10a             Short Arc Quads               Long Arc Quads               Chair Slides                               B = bilateral; AA = active assistive; A = active; P = passive       Treatment/Session Assessment:         Response to Treatment:  Pt. Doing well. Education:  [ ] Home Exercises  [X] Fall Precautions  [X] Hip Precautions [ ] D/C Instruction Review  [ x] Knee/Hip Prosthesis Review  [X] Walker Management/Safety [ ] Adaptive Equipment as Needed         Interdisciplinary Collaboration:   · Registered Nurse     After treatment position/precautions:   · Up in chair, Bed/Chair-wheels locked, Bed in low position and Call light within reach     Compliance with Program/Exercises: Will assess as treatment progresses. Recommendations/Intent for next treatment session:  Treatment next visit will focus on increasing Mr. Glez's independence with bed mobility, transfers, gait training, strength/ROM exercises, modalities for pain, and patient education.        Total Treatment Duration:  PT Patient Time In/Time Out  Time In: 0800  Time Out: 9600 [facility], PT

## 2017-08-17 NOTE — PROGRESS NOTES
Problem: Self Care Deficits Care Plan (Adult)  Goal: *Acute Goals and Plan of Care (Insert Text)  GOALS:   DISCHARGE GOALS (in preparation for going home/rehab): 3 days  1. Mr. Guru Shanks will perform one lower body dressing activity with minimal assistance with adaptive equipment to demonstrate improved functional mobility and safety. GOAL MET 8/17/2017     2. Mr. Guru Shanks will perform one lower body bathing activity with minimal assistance with adaptive equipment to demonstrate improved functional mobility and safety. GOAL MET 8/17/2017     3. Mr. Guru Shanks will perform toileting/toilet transfer with contact guard assistance with adaptive equipment to demonstrate improved functional mobility and safety. GOAL MET 8/17/2017     4. Mr. Guru Shanks will perform shower transfer with contact guard assistance with adaptive equipment to demonstrate improved functional mobility and safety. GOAL MET 8/17/2017     5. Mr. Guru Shanks will state SHANTANU precautions with two verbal cues to demonstrate improved functional mobility and safety. GOAL MET 8/17/2017            JOINT CAMP OCCUPATIONAL THERAPY SHANTANU: Daily Note and AM 8/17/2017  INPATIENT: Hospital Day: 2  [place] / [place] / Product Type: Medicare /      NAME/AGE/GENDER: John Watts is a 66 y.o. male        PRIMARY DIAGNOSIS:  Localized osteoarthrosis of right hip [M16.11]              Procedure(s) and Anesthesia Type:     * HIP ARTHROPLASTY TOTAL/ RIGHT - Spinal (Right)  ICD-10: Treatment Diagnosis:        · Pain in Right Hip (M25.551)  · Stiffness of Right Hip, Not elsewhere classified (M25.651)  · Other lack of cordination (R27.8)       ASSESSMENT:   Mr. Guru Shanks is s/p right SHANTANU and presents with decreased weight bearing on right LE and decreased independence with functional mobility and activities of daily living.   Patient completed shower and dressing as charter below in ADL grid and is ambulating with rolling walker and contact gaurd assist. Patient has met 5/5 goals and plans to return home with good family support. Family able to provide patient with appropriate level of assistance at this time. OT reviewed hip precautions throughout session and issued long handled sponge for home use. Patient instructed to call for assistance when needing to get up from recliner and all needs in reach. Patient verbalized understanding of call light. This section established at most recent assessment   PROBLEM LIST (Impairments causing functional limitations):  1. Decreased Strength  2. Decreased ADL/Functional Activities  3. Decreased Transfer Abilities  4. Increased Pain  5. Increased Fatigue  6. Decreased Flexibility/Joint Mobility  7. Decreased Knowledge of Precautions    INTERVENTIONS PLANNED: (Benefits and precautions of occupational therapy have been discussed with the patient.)  1. Activities of daily living training  2. Adaptive equipment training  3. Balance training  4. Clothing management  5. Donning&doffing training  6. Theraputic activity      TREATMENT PLAN: Frequency/Duration: Follow patient 1 time to address above goals. Rehabilitation Potential For Stated Goals: GOOD      RECOMMENDED REHABILITATION/EQUIPMENT: (at time of discharge pending progress): Continue Skilled Therapy and Home Health: Physical Therapy. OCCUPATIONAL PROFILE AND HISTORY:   History of Present Injury/Illness (Reason for Referral): Pt presents this date s/p (right) SHANTANU. Past Medical History/Comorbidities:   Mr. Abdirashid Culver  has a past medical history of Adverse effect of anesthesia; Environmental and seasonal allergies; HTN (hypertension); Insomnia; Osteoarthritis; and RBBB. Mr. Abdirashid Culver  has a past surgical history that includes tonsillectomy (1950's); ankle fracture tx (Left, 07/2012); rotator cuff repair (Right, 11/2012); and knee arthroscopy (Left, 2016).   Social History/Living Environment:   Home Environment: Private residence  # Steps to Enter: 2  Rails to [facility]: No  One/Two Story Residence: One story  Living Alone: No  Support Systems: Spouse/Significant Other/Partner  Patient Expects to be Discharged to[de-identified] Private residence  Current DME Used/Available at Home: Commode, bedside, Shower chair, Walker, rolling  Tub or Shower Type: Shower  Prior Level of Function/Work/Activity:  Mod I with ADLs      Number of Personal Factors/Comorbidities that affect the Plan of Care: Brief history (0):  LOW COMPLEXITY   ASSESSMENT OF OCCUPATIONAL PERFORMANCE[de-identified]   Most Recent Physical Functioning:   Balance  Sitting: Intact  Standing: With support        Patient Vitals for the past 6 hrs:   BP SpO2 Pulse   08/17/17 0735 (!) 145/94 95 % 62                                   Mental Status  Neurologic State: Alert  Orientation Level: Oriented X4  Cognition: Appropriate decision making; Appropriate for age attention/concentration; Appropriate safety awareness; Follows commands  Perception: Appears intact  Perseveration: No perseveration noted  Safety/Judgement: Awareness of environment; Fall prevention            RLE AROM  R Hip Flexion: 90  R Hip ABduction: 10  R Knee Extension: 0       Basic ADLs (From Assessment) Complex ADLs (From Assessment)   Basic ADL  Feeding: Setup  Oral Facial Hygiene/Grooming: Supervision  Bathing: Moderate assistance  Upper Body Dressing: Supervision  Lower Body Dressing: Moderate assistance  Toileting: Total assistance (catheter)     Grooming/Bathing/Dressing Activities of Daily Living   Grooming  Grooming Assistance: Supervision/set up  Washing Face: Supervision/set-up  Washing Hands: Supervision/set-up  Brushing Teeth: Supervision/set-up Cognitive Retraining  Safety/Judgement: Awareness of environment; Fall prevention   Upper Body Bathing  Bathing Assistance: Supervision/set-up  Position Performed: Standing     Lower Body Bathing  Bathing Assistance: Minimum assistance  Perineal  : Supervision/set-up  Position Performed: Standing  Lower Body : Minimum assistance  Position Performed: Seated in chair;Standing  Cues: Verbal cues provided  Adaptive Equipment: Long handled sponge     Upper Body Dressing Assistance  Dressing Assistance: Supervision/set-up  Pullover Shirt: Supervision/set-up Functional Transfers  Bathroom Mobility: Contact guard assistance  Toilet Transfer : Contact guard assistance  Shower Transfer: Contact guard assistance   Lower Body Dressing Assistance  Dressing Assistance: Minimum assistance  Underpants: Minimum assistance  Pants With Elastic Waist: Minimum assistance  Socks: Minimum assistance  Position Performed: Seated in chair  Cues: Tactile cues provided;Verbal cues provided  Adaptive Equipment Used: Reacher Bed/Mat Mobility  Supine to Sit: Contact guard assistance  Sit to Supine: Contact guard assistance  Sit to Stand: Contact guard assistance  Bed to Chair: Contact guard assistance           Physical Skills Involved:  1. Balance  2. Strength  3. Activity Tolerance Cognitive Skills Affected (resulting in the inability to perform in a timely and safe manner): 1. none Psychosocial Skills Affected:  1. safety awareness   Number of elements that affect the Plan of Care: 3-5:  MODERATE COMPLEXITY   CLINICAL DECISION MAKIN[address] [place] [id number] AM-PAC 6 Clicks   Daily Activity Inpatient Short Form  How much help from another person does the patient currently need. .. Total A Lot A Little None   1. Putting on and taking off regular lower body clothing?   [ ] 1   [X] 2   [ ] 3   [ ] 4   2. Bathing (including washing, rinsing, drying)? [ ] 1   [X] 2   [ ] 3   [ ] 4   3. Toileting, which includes using toilet, bedpan or urinal?   [X] 1   [ ] 2   [ ] 3   [ ] 4   4. Putting on and taking off regular upper body clothing?   [ ] 1   [ ] 2   [X] 3   [ ] 4   5. Taking care of personal grooming such as brushing teeth? [ ] 1   [ ] 2   [X] 3   [ ] 4   6. Eating meals?    [ ] 1   [ ] 2   [ ] 3   [X] 4   © , Trustees of [address] [place] [id number], under license to [employer]. All rights reserved   Score:  Initial: 15 Most Recent: X (Date: -- )     Interpretation of Tool:  Represents activities that are increasingly more difficult (i.e. Bed mobility, Transfers, Gait). Score 24 23 22-20 19-15 14-10 9-7 6       Modifier CH CI CJ CK CL CM CN         · Self Care:               - CURRENT STATUS:    CK - 40%-59% impaired, limited or restricted               - GOAL STATUS:           CJ - 20%-39% impaired, limited or restricted               - D/C STATUS:                       ---------------To be determined---------------  [place] / [place] / Product Type: Medicare /       Medical Necessity:     · Patient is expected to demonstrate progress in balance, coordination and functional technique to decrease assistance required with self care and functional mobility and improve safety during self care and functional mobility. Reason for Services/Other Comments:  · Patient continues to require skilled intervention due to decreased self care and functional mobility. Use of outcome tool(s) and clinical judgement create a POC that gives a: LOW COMPLEXITY                 TREATMENT:   (In addition to Assessment/Re-Assessment sessions the following treatments were rendered)      Pre-treatment Symptoms/Complaints:  Just had pain meds per patient report  Pain: Initial:   Pain Intensity 1: 0  Post Session:  0/10 rest      Self Care: (30): Procedure(s) (per grid) utilized to improve and/or restore self-care/home management as related to dressing, bathing and toileting. Required minimal verbal, tactile and   cueing to facilitate activities of daily living skills. Treatment/Session Assessment:         Response to Treatment:  Tolerated well plans to go home tomorrow.      Education:  [ ] Home Exercises  [X] Fall Precautions  [X] Hip Precautions [ ] Going Home Video  [ ] Knee/Hip Prosthesis Review  [X] Walker Management/Safety [X] Adaptive Equipment as Needed         Interdisciplinary Collaboration:   · Physical Therapist  · Occupational Therapist  · Registered Nurse     After treatment position/precautions:   · Up in chair, Bed alarm/tab alert on, Bed/Chair-wheels locked, Caregiver at bedside, Call light within reach, RN notified, Family at bedside and Side rails x 3     Compliance with Program/Exercises: compliant all of the time. Recommendations/Intent for next treatment session:  Treatment next visit will focus on increasing Mr. Patels independence with bed mobility, transfers, self care, functional mobility, modalities for pain, and patient education.        Total Treatment Duration: 30 mins  OT Patient Time In/Time Out  Time In: 1114  Time Out: [address] [place], [place]

## 2017-08-17 NOTE — PROGRESS NOTES
Patient is A&Ox4. Able to verbalize needs. Resting quietly with no distress noted. Shift assessment completed. Dressing to surgical site is dry and intact. Neurovascular and peripheral vascular checks WNL. Gleason draining clear yellow urine to bag. PIV infusing without difficulty. PIV dressing intact with no s/s of infection. Denies needs. Bed low and locked. Call light within reach. Instructed to call for assistance. Patient verbalizes understanding. Will continue to monitor.

## 2017-08-17 NOTE — PROGRESS NOTES
Given prescriptions and instructed how to take medications. Has follow up already scheduled. Home therapy to see pt. Instructed to wear TEDs until follow up and to call doctor if having fever excessive drainage, numbness or other problems. I have reviewed discharge instructions with the patient and spouse. The patient and spouse verbalized understanding. Pt in recliner eating lunch. To have group therapy after lunch and then going home with wife.

## 2017-08-17 NOTE — PROGRESS NOTES
2017         Post Op day: 1 Day Post-Op     Admit Date: 2017  Admit Diagnosis: Localized osteoarthrosis of right hip [M16.11]        Subjective: Patient doing well. No concerns/complaints. No shortness of breath, chest pain or nausea/vomiting. Pain well controlled     Objective:   Visit Vitals    /68    Pulse 71    Temp 96.7 °F (35.9 °C)    Resp 16    Ht 5' 10\" (1.778 m)    Wt 88 kg (194 lb)    SpO2 96%    BMI 27.84 kg/m2    Temp (24hrs), Av.7 °F (35.9 °C), Min:95.2 °F (35.1 °C), Max:98.3 °F (36.8 °C)    Lab Results   Component Value Date/Time    HGB 11.3 2017 04:30 AM     Extremity Exam  Dressing Clean, dry Prieno intact. Incision benign (no erythema, induration)  +Tibialis Anterior and Gastroc-Soleus Right lower extremity  Sensation intact to light touch  Extremity perfused  TEDS/SCDS  No sign of DVT     Assessment / Plan :  WBAT RLE  Posterior hip precautions  PT/OT  ASA along with SCDs/TEDS for DVT prophylaxis in house. Discharge on ASA BID  DIspo-Home with HH  Patient Active Problem List   Diagnosis Code    OA (osteoarthritis) of hip M16.9          Signed By: Avtar Bee MD     I have reviewed the patients controlled substance prescription history, as maintained in the [place] prescription monitoring program, so that the prescription(s) for a  controlled substance can be given.

## 2017-08-18 ENCOUNTER — HOME CARE VISIT (OUTPATIENT)
Dept: SCHEDULING | Facility: HOME HEALTH | Age: 72
End: 2017-08-18
Payer: MEDICARE

## 2017-08-18 PROCEDURE — G0151 HHCP-SERV OF PT,EA 15 MIN: HCPCS

## 2017-08-18 PROCEDURE — G0299 HHS/HOSPICE OF RN EA 15 MIN: HCPCS

## 2017-08-18 PROCEDURE — 400013 HH SOC

## 2017-08-18 PROCEDURE — 3331090002 HH PPS REVENUE DEBIT

## 2017-08-18 PROCEDURE — 3331090001 HH PPS REVENUE CREDIT

## 2017-08-19 VITALS
DIASTOLIC BLOOD PRESSURE: 82 MMHG | TEMPERATURE: 98.2 F | RESPIRATION RATE: 16 BRPM | SYSTOLIC BLOOD PRESSURE: 130 MMHG | OXYGEN SATURATION: 97 % | HEART RATE: 68 BPM

## 2017-08-19 VITALS
DIASTOLIC BLOOD PRESSURE: 70 MMHG | WEIGHT: 202 LBS | SYSTOLIC BLOOD PRESSURE: 131 MMHG | RESPIRATION RATE: 16 BRPM | TEMPERATURE: 98 F | HEART RATE: 68 BPM | HEIGHT: 70 IN | BODY MASS INDEX: 28.92 KG/M2

## 2017-08-19 PROCEDURE — 3331090001 HH PPS REVENUE CREDIT

## 2017-08-19 PROCEDURE — 3331090002 HH PPS REVENUE DEBIT

## 2017-08-20 PROCEDURE — 3331090002 HH PPS REVENUE DEBIT

## 2017-08-20 PROCEDURE — 3331090001 HH PPS REVENUE CREDIT

## 2017-08-21 ENCOUNTER — HOME CARE VISIT (OUTPATIENT)
Dept: SCHEDULING | Facility: HOME HEALTH | Age: 72
End: 2017-08-21
Payer: MEDICARE

## 2017-08-21 VITALS
SYSTOLIC BLOOD PRESSURE: 130 MMHG | HEART RATE: 72 BPM | RESPIRATION RATE: 17 BRPM | DIASTOLIC BLOOD PRESSURE: 78 MMHG | TEMPERATURE: 97 F

## 2017-08-21 PROCEDURE — G0157 HHC PT ASSISTANT EA 15: HCPCS

## 2017-08-21 PROCEDURE — 3331090002 HH PPS REVENUE DEBIT

## 2017-08-21 PROCEDURE — 3331090001 HH PPS REVENUE CREDIT

## 2017-08-22 PROCEDURE — 3331090001 HH PPS REVENUE CREDIT

## 2017-08-22 PROCEDURE — 3331090002 HH PPS REVENUE DEBIT

## 2017-08-23 ENCOUNTER — HOME CARE VISIT (OUTPATIENT)
Dept: SCHEDULING | Facility: HOME HEALTH | Age: 72
End: 2017-08-23
Payer: MEDICARE

## 2017-08-23 VITALS
TEMPERATURE: 96.6 F | DIASTOLIC BLOOD PRESSURE: 70 MMHG | SYSTOLIC BLOOD PRESSURE: 118 MMHG | HEART RATE: 68 BPM | RESPIRATION RATE: 17 BRPM

## 2017-08-23 PROCEDURE — 3331090001 HH PPS REVENUE CREDIT

## 2017-08-23 PROCEDURE — G0157 HHC PT ASSISTANT EA 15: HCPCS

## 2017-08-23 PROCEDURE — 3331090002 HH PPS REVENUE DEBIT

## 2017-08-24 PROCEDURE — 3331090001 HH PPS REVENUE CREDIT

## 2017-08-24 PROCEDURE — 3331090002 HH PPS REVENUE DEBIT

## 2017-08-25 ENCOUNTER — HOME CARE VISIT (OUTPATIENT)
Dept: SCHEDULING | Facility: HOME HEALTH | Age: 72
End: 2017-08-25
Payer: MEDICARE

## 2017-08-25 VITALS
SYSTOLIC BLOOD PRESSURE: 120 MMHG | DIASTOLIC BLOOD PRESSURE: 78 MMHG | HEART RATE: 68 BPM | RESPIRATION RATE: 17 BRPM | TEMPERATURE: 98.1 F

## 2017-08-25 PROCEDURE — 3331090001 HH PPS REVENUE CREDIT

## 2017-08-25 PROCEDURE — G0157 HHC PT ASSISTANT EA 15: HCPCS

## 2017-08-25 PROCEDURE — 3331090002 HH PPS REVENUE DEBIT

## 2017-08-26 PROCEDURE — 3331090002 HH PPS REVENUE DEBIT

## 2017-08-26 PROCEDURE — 3331090001 HH PPS REVENUE CREDIT

## 2017-08-27 PROCEDURE — 3331090002 HH PPS REVENUE DEBIT

## 2017-08-27 PROCEDURE — 3331090001 HH PPS REVENUE CREDIT

## 2017-08-28 PROCEDURE — 3331090002 HH PPS REVENUE DEBIT

## 2017-08-28 PROCEDURE — 3331090001 HH PPS REVENUE CREDIT

## 2017-08-29 ENCOUNTER — HOME CARE VISIT (OUTPATIENT)
Dept: SCHEDULING | Facility: HOME HEALTH | Age: 72
End: 2017-08-29
Payer: MEDICARE

## 2017-08-29 VITALS
TEMPERATURE: 97 F | DIASTOLIC BLOOD PRESSURE: 78 MMHG | HEART RATE: 76 BPM | RESPIRATION RATE: 17 BRPM | SYSTOLIC BLOOD PRESSURE: 120 MMHG

## 2017-08-29 PROCEDURE — 3331090001 HH PPS REVENUE CREDIT

## 2017-08-29 PROCEDURE — G0157 HHC PT ASSISTANT EA 15: HCPCS

## 2017-08-29 PROCEDURE — 3331090002 HH PPS REVENUE DEBIT

## 2017-08-30 PROCEDURE — 3331090001 HH PPS REVENUE CREDIT

## 2017-08-30 PROCEDURE — 3331090002 HH PPS REVENUE DEBIT

## 2017-08-31 ENCOUNTER — HOME CARE VISIT (OUTPATIENT)
Dept: SCHEDULING | Facility: HOME HEALTH | Age: 72
End: 2017-08-31
Payer: MEDICARE

## 2017-08-31 VITALS
TEMPERATURE: 96.9 F | HEART RATE: 76 BPM | SYSTOLIC BLOOD PRESSURE: 140 MMHG | DIASTOLIC BLOOD PRESSURE: 80 MMHG | RESPIRATION RATE: 17 BRPM

## 2017-08-31 PROCEDURE — 3331090001 HH PPS REVENUE CREDIT

## 2017-08-31 PROCEDURE — 3331090002 HH PPS REVENUE DEBIT

## 2017-08-31 PROCEDURE — G0157 HHC PT ASSISTANT EA 15: HCPCS

## 2017-09-01 ENCOUNTER — HOME CARE VISIT (OUTPATIENT)
Dept: SCHEDULING | Facility: HOME HEALTH | Age: 72
End: 2017-09-01
Payer: MEDICARE

## 2017-09-01 PROCEDURE — 3331090002 HH PPS REVENUE DEBIT

## 2017-09-01 PROCEDURE — G0299 HHS/HOSPICE OF RN EA 15 MIN: HCPCS

## 2017-09-01 PROCEDURE — 3331090001 HH PPS REVENUE CREDIT

## 2017-09-02 PROCEDURE — 3331090001 HH PPS REVENUE CREDIT

## 2017-09-02 PROCEDURE — 3331090002 HH PPS REVENUE DEBIT

## 2017-09-03 PROCEDURE — 3331090002 HH PPS REVENUE DEBIT

## 2017-09-03 PROCEDURE — 3331090001 HH PPS REVENUE CREDIT

## 2017-09-04 PROCEDURE — 3331090001 HH PPS REVENUE CREDIT

## 2017-09-04 PROCEDURE — 3331090002 HH PPS REVENUE DEBIT

## 2017-09-05 ENCOUNTER — HOME CARE VISIT (OUTPATIENT)
Dept: SCHEDULING | Facility: HOME HEALTH | Age: 72
End: 2017-09-05
Payer: MEDICARE

## 2017-09-05 VITALS
SYSTOLIC BLOOD PRESSURE: 136 MMHG | TEMPERATURE: 97 F | DIASTOLIC BLOOD PRESSURE: 72 MMHG | RESPIRATION RATE: 17 BRPM | HEART RATE: 67 BPM | OXYGEN SATURATION: 98 %

## 2017-09-05 VITALS
DIASTOLIC BLOOD PRESSURE: 88 MMHG | TEMPERATURE: 87.1 F | RESPIRATION RATE: 17 BRPM | HEART RATE: 80 BPM | SYSTOLIC BLOOD PRESSURE: 120 MMHG

## 2017-09-05 PROCEDURE — G0157 HHC PT ASSISTANT EA 15: HCPCS

## 2017-09-05 PROCEDURE — 3331090002 HH PPS REVENUE DEBIT

## 2017-09-05 PROCEDURE — 3331090001 HH PPS REVENUE CREDIT

## 2017-09-06 ENCOUNTER — HOME CARE VISIT (OUTPATIENT)
Dept: SCHEDULING | Facility: HOME HEALTH | Age: 72
End: 2017-09-06
Payer: MEDICARE

## 2017-09-06 VITALS
DIASTOLIC BLOOD PRESSURE: 86 MMHG | HEART RATE: 66 BPM | RESPIRATION RATE: 19 BRPM | TEMPERATURE: 97.8 F | SYSTOLIC BLOOD PRESSURE: 140 MMHG

## 2017-09-06 PROCEDURE — 3331090001 HH PPS REVENUE CREDIT

## 2017-09-06 PROCEDURE — G0151 HHCP-SERV OF PT,EA 15 MIN: HCPCS

## 2017-09-06 PROCEDURE — 3331090003 HH PPS REVENUE ADJ

## 2017-09-06 PROCEDURE — 3331090002 HH PPS REVENUE DEBIT

## 2017-09-07 PROCEDURE — 3331090001 HH PPS REVENUE CREDIT

## 2017-09-07 PROCEDURE — 3331090002 HH PPS REVENUE DEBIT

## 2017-09-08 PROCEDURE — 3331090001 HH PPS REVENUE CREDIT

## 2017-09-08 PROCEDURE — 3331090002 HH PPS REVENUE DEBIT

## 2017-09-09 PROCEDURE — 3331090001 HH PPS REVENUE CREDIT

## 2017-09-09 PROCEDURE — 3331090002 HH PPS REVENUE DEBIT

## 2017-09-10 PROCEDURE — 3331090001 HH PPS REVENUE CREDIT

## 2017-09-10 PROCEDURE — 3331090002 HH PPS REVENUE DEBIT

## 2019-05-14 ENCOUNTER — HOSPITAL ENCOUNTER (OUTPATIENT)
Dept: CT IMAGING | Age: 74
Discharge: HOME | End: 2019-05-14
Attending: ORTHOPAEDIC SURGERY
Payer: MEDICARE

## 2019-05-14 VITALS
TEMPERATURE: 98.2 F | HEART RATE: 68 BPM | DIASTOLIC BLOOD PRESSURE: 96 MMHG | OXYGEN SATURATION: 97 % | BODY MASS INDEX: 28.92 KG/M2 | SYSTOLIC BLOOD PRESSURE: 186 MMHG | RESPIRATION RATE: 18 BRPM | WEIGHT: 202 LBS | HEIGHT: 70 IN

## 2019-05-14 DIAGNOSIS — M21.70 LOWER LIMB LENGTH DIFFERENCE: ICD-10-CM

## 2019-05-14 DIAGNOSIS — M70.71 ILIOPSOAS BURSITIS OF RIGHT HIP: ICD-10-CM

## 2019-05-14 DIAGNOSIS — M25.551 RIGHT HIP PAIN: ICD-10-CM

## 2019-05-14 DIAGNOSIS — Z96.641 HISTORY OF TOTAL RIGHT HIP ARTHROPLASTY: ICD-10-CM

## 2019-05-14 PROCEDURE — 20550 NJX 1 TENDON SHEATH/LIGAMENT: CPT

## 2019-05-14 PROCEDURE — 74011250636 HC RX REV CODE- 250/636: Performed by: RADIOLOGY

## 2019-05-14 PROCEDURE — 74011000250 HC RX REV CODE- 250: Performed by: RADIOLOGY

## 2019-05-14 RX ORDER — BUPIVACAINE HYDROCHLORIDE 5 MG/ML
1-10 INJECTION, SOLUTION EPIDURAL; INTRACAUDAL ONCE
Status: COMPLETED | OUTPATIENT
Start: 2019-05-14 | End: 2019-05-14

## 2019-05-14 RX ORDER — METHYLPREDNISOLONE ACETATE 80 MG/ML
80 INJECTION, SUSPENSION INTRA-ARTICULAR; INTRALESIONAL; INTRAMUSCULAR; SOFT TISSUE ONCE
Status: COMPLETED | OUTPATIENT
Start: 2019-05-14 | End: 2019-05-14

## 2019-05-14 RX ORDER — LIDOCAINE HYDROCHLORIDE 20 MG/ML
1-10 INJECTION, SOLUTION INFILTRATION; PERINEURAL ONCE
Status: COMPLETED | OUTPATIENT
Start: 2019-05-14 | End: 2019-05-14

## 2019-05-14 RX ADMIN — LIDOCAINE HYDROCHLORIDE 100 MG: 20 INJECTION, SOLUTION INFILTRATION; PERINEURAL at 10:42

## 2019-05-14 RX ADMIN — BUPIVACAINE HYDROCHLORIDE 25 MG: 5 INJECTION, SOLUTION EPIDURAL; INTRACAUDAL at 10:46

## 2019-05-14 RX ADMIN — METHYLPREDNISOLONE ACETATE 80 MG: 80 INJECTION, SUSPENSION INTRA-ARTICULAR; INTRALESIONAL; INTRAMUSCULAR; SOFT TISSUE at 10:47

## 2019-05-14 NOTE — DISCHARGE INSTRUCTIONS
[address] [facility] Tremayne Garvin  Department of Interventional Radiology  [facility]  [phone] Office  [phone] Fax    Steroid Injection Discharge Instructions    General Information:   A steroid injection was performed today, placing a combination of a steroid and an anesthetic (numbing medicine) into the space around the nerves of your spine. This is done to treat back pain. It may take 7-10 days for the injection to reach its full potential.  This procedure can be done at any level of the spinal column, depending on where your pain is. Your doctor will have ordered the appropriate level to be treated prior to your coming in for the procedure. Home Care Instructions: You can resume your regular diet. Do not drink alcohol. You may notice that you have to use your pain medications less after your injection. Some people do not notice much of a change in their pain after the first injection. If that is the case, it is worth your time to have a second one done. This is why these injections are sometimes ordered in a series of three. Keep the puncture site clean and dry for 24 hours, and then you may remove the dressing. Showering is acceptable after the bandage is removed. Call If:   You should call your Physician and/or the Radiology Nurse if you have any bleeding other than a small spot on your bandage. Call if you have any signs of infection, fever, increased pain at the puncture site, confusion, or a headache that worsens when you stand and eases when lying flat. Follow-Up Instructions:  Please see your ordering doctor as he/she has requested. Let your doctor know if you have relief from your pain so they may schedule another injection for you if it is indicated. To Reach Us: If you have any questions about your procedure, please call the Interventional Radiology department at [phone].  After business hours (5pm) and weekends, call the answering service at [phone] and ask for the Radiologist on call to be paged. Si tiene Preguntas acerca del procedimiento, por favor llame al departamento de Radiología Intervencional al [phone]. Después de horas de oficina (5 pm) y los fines de [place], llamar al [place] al [phone] y pregunte por el Radiologo de [facility]. Interventional Radiology General Nurse Discharge    After general anesthesia or intravenous sedation, for 24 hours or while taking prescription Narcotics:  · Limit your activities  · Do not drive and operate hazardous machinery  · Do not make important personal or business decisions  · Do  not drink alcoholic beverages  · If you have not urinated within 8 hours after discharge, please contact your surgeon on call. * Please give a list of your current medications to your Primary Care Provider. * Please update this list whenever your medications are discontinued, doses are     changed, or new medications (including over-the-counter products) are added. * Please carry medication information at all times in case of emergency situations. These are general instructions for a healthy lifestyle:    No smoking/ No tobacco products/ Avoid exposure to second hand smoke  Surgeon General's Warning:  Quitting smoking now greatly reduces serious risk to your health. Obesity, smoking, and sedentary lifestyle greatly increases your risk for illness  A healthy diet, regular physical exercise & weight monitoring are important for maintaining a healthy lifestyle    You may be retaining fluid if you have a history of heart failure or if you experience any of the following symptoms:  Weight gain of 3 pounds or more overnight or 5 pounds in a week, increased swelling in our hands or feet or shortness of breath while lying flat in bed. Please call your doctor as soon as you notice any of these symptoms; do not wait until your next office visit.     Recognize signs and symptoms of STROKE:  F-face looks uneven    A-arms unable to move or move unevenly    S-speech slurred or non-existent    T-time-call 911 as soon as signs and symptoms begin-DO NOT go       Back to bed or wait to see if you get better-TIME IS BRAIN.       Patient Signature:  Date: 5/14/2019  Discharging Nurse: Deja العلي RN

## 2020-11-23 NOTE — PROGRESS NOTES
Met with pt and spouse in room. Pt plans to dc tomorrow back home with [facility] PT. Pt reports he has DME but will make sure the walker is the correct height with PT. Referral made to [facility]. SW will order new walker if needed.   Gregory Caruso Render Post-Care Instructions In Note?: yes Detail Level: Simple Duration Of Freeze Thaw-Cycle (Seconds): 3 Number Of Freeze-Thaw Cycles: 3 freeze-thaw cycles Post-Care Instructions: I reviewed with the patient in detail post-care instructions. Patient is to wear sunprotection, and avoid picking at any of the treated lesions. Pt may apply Vaseline to crusted or scabbing areas. Patient has also received a handout with instructions on caring for the wound and office contact information. Consent: The patient's consent was obtained including but not limited to risks of crusting, scabbing, blistering, scarring, darker or lighter pigmentary change, recurrence, incomplete removal and infection.

## (undated) DEVICE — T4 HOOD

## (undated) DEVICE — 2000CC GUARDIAN II: Brand: GUARDIAN

## (undated) DEVICE — DRAPE,U/SHT,SPLIT,FILM,60X84,STERILE: Brand: MEDLINE

## (undated) DEVICE — AMD ANTIMICROBIAL GAUZE SPONGES,12 PLY USP TYPE VII, 0.2% POLYHEXAMETHYLENE BIGUANIDE HCI (PHMB): Brand: CURITY

## (undated) DEVICE — SYR LR LCK 1ML GRAD NSAF 30ML --

## (undated) DEVICE — TRAY CATH 16F DRN BG LTX -- CONVERT TO ITEM 363158

## (undated) DEVICE — MCLASS OSCILLATING SAW BLADE 19 X 1.27 (0.050") X 90 MM: Brand: MCLASS

## (undated) DEVICE — FAN SPRAY KIT: Brand: PULSAVAC®

## (undated) DEVICE — BUTTON SWITCH PENCIL BLADE ELECTRODE, HOLSTER: Brand: EDGE

## (undated) DEVICE — (D)SYR 10ML 1/5ML GRAD NSAF -- PKGING CHANGE USE ITEM 338027

## (undated) DEVICE — NEEDLE SPNL 22GA L3.5IN BLK HUB S STL REG WALL FIT STYL W/

## (undated) DEVICE — GOWN,AURORA,FABRIC-REINFORCED,2XL: Brand: MEDLINE

## (undated) DEVICE — JELLY LUBRICATING 10GM PREFIL SYR LUBE

## (undated) DEVICE — BLADE ELECTRODE: Brand: VALLEYLAB

## (undated) DEVICE — DRAPE,HIP,W/POUCHES,STERILE: Brand: MEDLINE

## (undated) DEVICE — 1010 S-DRAPE TOWEL DRAPE 10/BX: Brand: STERI-DRAPE™

## (undated) DEVICE — PAD,ABDOMINAL,5"X9",STERILE,LF,1/PK: Brand: MEDLINE INDUSTRIES, INC.

## (undated) DEVICE — NEEDLE HYPO 21GA L1.5IN INTRAMUSCULAR S STL LATCH BVL UP

## (undated) DEVICE — AMD ANTIMICROBIAL NON-ADHERENT PAD,0.2% POLYHEXAMETHYLENE BIGUANIDE HCI (PHMB): Brand: TELFA

## (undated) DEVICE — SUTURE FIBERWIRE SZ 2 W/ TAPERED NEEDLE BLUE L38IN NONABSORB BLU L26.5MM 1/2 CIRCLE AR7200

## (undated) DEVICE — 3M™ STERI-DRAPE™ INSTRUMENT POUCH 1018: Brand: STERI-DRAPE™

## (undated) DEVICE — TRAY PREP DRY W/ PREM GLV 2 APPL 6 SPNG 2 UNDPD 1 OVERWRAP

## (undated) DEVICE — SOLUTION IRRIG 3000ML 0.9% SOD CHL FLX CONT 0797208] ICU MEDICAL INC]

## (undated) DEVICE — HEWSON SUTURE RETRIEVER: Brand: HEWSON SUTURE RETRIEVER

## (undated) DEVICE — BIPOLAR SEALER 23-112-1 AQM 6.0: Brand: AQUAMANTYS ®

## (undated) DEVICE — SURGICAL PROCEDURE PACK TOT HIP CDS

## (undated) DEVICE — (D)PREP SKN CHLRAPRP APPL 26ML -- CONVERT TO ITEM 371833

## (undated) DEVICE — SUTURE MCRYL SZ 3-0 L27IN ABSRB UD L19MM PS-2 3/8 CIR PRIM Y427H

## (undated) DEVICE — MEDI-VAC YANKAUER SUCTION HANDLE W/BULBOUS TIP: Brand: CARDINAL HEALTH

## (undated) DEVICE — Z DISCONTINUED USE 2423037 APPLICATOR MEDICATED 3 CC CHLORHEXIDINE GLUC 2% CHLORAPREP

## (undated) DEVICE — SUTURE VCRL SZ 2-0 L27IN ABSRB UD L36MM CP-1 1/2 CIR REV J266H

## (undated) DEVICE — 3M™ STERI-DRAPE™ INCISE DRAPE, XL 1051: Brand: STERI-DRAPE™

## (undated) DEVICE — CONTAINER,SPECIMEN,O.R.STRL,4.5OZ: Brand: MEDLINE

## (undated) DEVICE — REM POLYHESIVE ADULT PATIENT RETURN ELECTRODE: Brand: VALLEYLAB

## (undated) DEVICE — 1840 FOAM BLOCK NEEDLE COUNTER: Brand: DEVON

## (undated) DEVICE — SYSTEM SKIN CLSR 22CM DERMBND PRINEO

## (undated) DEVICE — 3M™ COBAN™ NL STERILE NON-LATEX SELF-ADHERENT WRAP, 2084S, 4 IN X 5 YD (10 CM X 4,5 M), 18 ROLLS/CASE: Brand: 3M™ COBAN™

## (undated) DEVICE — SUTURE STRATAFIX SPRL SZ 1 L5IN ABSRB VLT CT-1 L36MM 1/2 SXPD2B401

## (undated) DEVICE — 3000CC GUARDIAN II: Brand: GUARDIAN

## (undated) DEVICE — SOLUTION IV 1000ML 0.9% SOD CHL

## (undated) DEVICE — SOLUTION IV DEXTROSE/SALINE 5%-0.9% 500ML - 500ML